# Patient Record
Sex: MALE | Race: WHITE | NOT HISPANIC OR LATINO | Employment: OTHER | URBAN - METROPOLITAN AREA
[De-identification: names, ages, dates, MRNs, and addresses within clinical notes are randomized per-mention and may not be internally consistent; named-entity substitution may affect disease eponyms.]

---

## 2017-01-02 ENCOUNTER — ANESTHESIA (OUTPATIENT)
Dept: SURGERY | Facility: CLINIC | Age: 62
End: 2017-01-02
Payer: COMMERCIAL

## 2017-01-02 ENCOUNTER — ANESTHESIA EVENT (OUTPATIENT)
Dept: SURGERY | Facility: CLINIC | Age: 62
End: 2017-01-02
Payer: COMMERCIAL

## 2017-01-02 PROCEDURE — 25800025 ZZH RX 258: Performed by: NURSE ANESTHETIST, CERTIFIED REGISTERED

## 2017-01-02 PROCEDURE — 25000125 ZZHC RX 250: Performed by: NURSE ANESTHETIST, CERTIFIED REGISTERED

## 2017-01-02 PROCEDURE — S0077 INJECTION, CLINDAMYCIN PHOSP: HCPCS | Performed by: SURGERY

## 2017-01-02 PROCEDURE — 25000125 ZZHC RX 250: Performed by: SURGERY

## 2017-01-02 RX ORDER — ONDANSETRON 2 MG/ML
INJECTION INTRAMUSCULAR; INTRAVENOUS PRN
Status: DISCONTINUED | OUTPATIENT
Start: 2017-01-02 | End: 2017-01-02

## 2017-01-02 RX ORDER — FENTANYL CITRATE 50 UG/ML
INJECTION, SOLUTION INTRAMUSCULAR; INTRAVENOUS PRN
Status: DISCONTINUED | OUTPATIENT
Start: 2017-01-02 | End: 2017-01-02

## 2017-01-02 RX ORDER — LIDOCAINE HYDROCHLORIDE 20 MG/ML
INJECTION, SOLUTION INFILTRATION; PERINEURAL PRN
Status: DISCONTINUED | OUTPATIENT
Start: 2017-01-02 | End: 2017-01-02

## 2017-01-02 RX ORDER — PROPOFOL 10 MG/ML
INJECTION, EMULSION INTRAVENOUS PRN
Status: DISCONTINUED | OUTPATIENT
Start: 2017-01-02 | End: 2017-01-02

## 2017-01-02 RX ORDER — DEXAMETHASONE SODIUM PHOSPHATE 10 MG/ML
INJECTION, SOLUTION INTRAMUSCULAR; INTRAVENOUS PRN
Status: DISCONTINUED | OUTPATIENT
Start: 2017-01-02 | End: 2017-01-02

## 2017-01-02 RX ORDER — KETOROLAC TROMETHAMINE 30 MG/ML
INJECTION, SOLUTION INTRAMUSCULAR; INTRAVENOUS PRN
Status: DISCONTINUED | OUTPATIENT
Start: 2017-01-02 | End: 2017-01-02

## 2017-01-02 RX ORDER — ACETAMINOPHEN 10 MG/ML
INJECTION, SOLUTION INTRAVENOUS PRN
Status: DISCONTINUED | OUTPATIENT
Start: 2017-01-02 | End: 2017-01-02

## 2017-01-02 RX ADMIN — FENTANYL CITRATE 100 MCG: 50 INJECTION, SOLUTION INTRAMUSCULAR; INTRAVENOUS at 13:05

## 2017-01-02 RX ADMIN — CLINDAMYCIN PHOSPHATE 900 MG: 18 INJECTION, SOLUTION INTRAVENOUS at 13:11

## 2017-01-02 RX ADMIN — ACETAMINOPHEN 1000 MG: 10 INJECTION, SOLUTION INTRAVENOUS at 13:15

## 2017-01-02 RX ADMIN — DEXAMETHASONE SODIUM PHOSPHATE 10 MG: 10 INJECTION, SOLUTION INTRAMUSCULAR; INTRAVENOUS at 13:16

## 2017-01-02 RX ADMIN — KETOROLAC TROMETHAMINE 30 MG: 30 INJECTION, SOLUTION INTRAMUSCULAR at 14:02

## 2017-01-02 RX ADMIN — PROPOFOL 200 MG: 10 INJECTION, EMULSION INTRAVENOUS at 13:09

## 2017-01-02 RX ADMIN — LIDOCAINE HYDROCHLORIDE 40 MG: 20 INJECTION, SOLUTION INFILTRATION; PERINEURAL at 13:09

## 2017-01-02 RX ADMIN — SODIUM CHLORIDE, POTASSIUM CHLORIDE, SODIUM LACTATE AND CALCIUM CHLORIDE: 600; 310; 30; 20 INJECTION, SOLUTION INTRAVENOUS at 13:00

## 2017-01-02 RX ADMIN — ONDANSETRON 4 MG: 2 INJECTION INTRAMUSCULAR; INTRAVENOUS at 14:02

## 2017-01-02 RX ADMIN — MIDAZOLAM HYDROCHLORIDE 2 MG: 1 INJECTION, SOLUTION INTRAMUSCULAR; INTRAVENOUS at 13:00

## 2017-01-02 ASSESSMENT — LIFESTYLE VARIABLES: TOBACCO_USE: 1

## 2017-01-02 NOTE — ANESTHESIA PREPROCEDURE EVALUATION
Anesthesia Evaluation     . Pt has had prior anesthetic. Type: MAC    No history of anesthetic complications     ROS/MED HX    ENT/Pulmonary:     (+)tobacco use, Current use 2 ppd x 47 years packs/day  , . .    Neurologic:  - neg neurologic ROS     Cardiovascular:  - neg cardiovascular ROS       METS/Exercise Tolerance:     Hematologic:  - neg hematologic  ROS       Musculoskeletal:  - neg musculoskeletal ROS       GI/Hepatic:     (+) Other GI/Hepatic Hx ETOH Abuse - went through treatment in 1984      Renal/Genitourinary:  - ROS Renal section negative       Endo:  - neg endo ROS       Psychiatric:  - neg psychiatric ROS       Infectious Disease:  - neg infectious disease ROS       Malignancy:      - no malignancy   Other:    - neg other ROS           Physical Exam  Normal systems: cardiovascular, pulmonary and dental    Airway   Mallampati: II  TM distance: >3 FB  Neck ROM: full    Dental   (+) upper dentures and lower dentures    Cardiovascular   Rhythm and rate: regular and normal  (-) no murmur    Pulmonary    breath sounds clear to auscultation                    Anesthesia Plan      History & Physical Review  History and physical reviewed and following examination; no interval change.    ASA Status:  2 .    NPO Status:  > 6 hours    Plan for General and LMA with Intravenous and Propofol induction. Maintenance will be Balanced.    PONV prophylaxis:  Ondansetron (or other 5HT-3) and Dexamethasone or Solumedrol       Postoperative Care  Postoperative pain management:  IV analgesics and Oral pain medications.      Consents  Anesthetic plan, risks, benefits and alternatives discussed with:  Patient.  Use of blood products discussed: No .   .                          .

## 2017-01-02 NOTE — ANESTHESIA CARE TRANSFER NOTE
Patient: Murphy Pratt    HERNIORRHAPHY INGUINAL (Left Update)  Additional InformationProcedure(s):  open mesh repair of left inguinal hernia - Wound Class: I-Clean    Diagnosis: left inguinal hernia  Diagnosis Additional Information: No value filed.    Anesthesia Type:   General, LMA     Note:  Airway :Nasal Cannula  Patient transferred to:PACU        Vitals: (Last set prior to Anesthesia Care Transfer)              Electronically Signed By: TICO Ramos CRNA  January 2, 2017  2:18 PM

## 2017-01-03 NOTE — ANESTHESIA POSTPROCEDURE EVALUATION
Patient: Murphy Pratt    HERNIORRHAPHY INGUINAL (Left Update)  Additional InformationProcedure(s):  open mesh repair of left inguinal hernia - Wound Class: I-Clean    Diagnosis:left inguinal hernia  Diagnosis Additional Information: No value filed.    Anesthesia Type:  General, LMA    Note:  Anesthesia Post Evaluation    Patient location during evaluation: Phase 2  Patient participation: Able to fully participate in evaluation  Level of consciousness: awake and alert  Pain management: adequate  Airway patency: patent  Cardiovascular status: stable  Respiratory status: spontaneous ventilation and room air  Hydration status: stable     Anesthetic complications: None    Comments: Appear to tolerate Gen well without anesthesia related problems / complications noted.  Pain level adequate per patient. No N  /  V.  No complaints per patient.  Will follow as needed.        Last vitals:  Filed Vitals:    01/02/17 1449 01/02/17 1500 01/02/17 1515   BP: 120/81 126/90 121/79   Temp:      Resp: 18 18 18   SpO2:  96%        Electronically Signed By: TICO Ramos CRNA  January 2, 2017  7:39 PM

## 2017-01-16 ENCOUNTER — OFFICE VISIT (OUTPATIENT)
Dept: SURGERY | Facility: CLINIC | Age: 62
End: 2017-01-16
Payer: COMMERCIAL

## 2017-01-16 VITALS — HEIGHT: 70 IN | TEMPERATURE: 97.6 F | WEIGHT: 170 LBS | BODY MASS INDEX: 24.34 KG/M2

## 2017-01-16 DIAGNOSIS — K40.90 HERNIA, INGUINAL, LEFT: Primary | ICD-10-CM

## 2017-01-16 PROCEDURE — 99024 POSTOP FOLLOW-UP VISIT: CPT | Performed by: SURGERY

## 2017-01-16 NOTE — MR AVS SNAPSHOT
"              After Visit Summary   1/16/2017    Murphy Pratt    MRN: [id number]           Patient Information     Date Of Birth          1955        Visit Information        Provider Department      1/16/2017 8:00 AM Rand Ricketts MD [facility]        Today's Diagnoses     Hernia, inguinal, left    -  1        Follow-ups after your visit        Who to contact     If you have questions or need follow up information about today's clinic visit or your schedule please contact [facility] directly at [phone].  Normal or non-critical lab and imaging results will be communicated to you by [employer], letter or phone within 4 business days after the clinic has received the results. If you do not hear from us within 7 days, please contact the clinic through [employer] or phone. If you have a critical or abnormal lab result, we will notify you by phone as soon as possible.  Submit refill requests through [employer] or call your pharmacy and they will forward the refill request to us. Please allow 3 business days for your refill to be completed.          Additional Information About Your Visit        MyChart Information     [employer] gives you secure access to your electronic health record. If you see a primary care provider, you can also send messages to your care team and make appointments. If you have questions, please call your primary care clinic.  If you do not have a primary care provider, please call [phone] and they will assist you.        Care EveryWhere ID     This is your Care EveryWhere ID. This could be used by other organizations to access your [place] medical records  [id number]        Your Vitals Were     Temperature Height BMI (Body Mass Index)             97.6  F (36.4  C) (Skin) 5' 10\" (1.778 m) 24.39 kg/m2          Blood Pressure from Last 3 Encounters:   01/02/17 121/79   12/16/16 114/72   10/28/16 126/74    Weight from Last 3 Encounters:   01/16/17 170 lb " (77.111 kg)   12/16/16 175 lb 3.2 oz (79.47 kg)   10/28/16 169 lb (76.658 kg)              Today, you had the following     No orders found for display       Primary Care Provider Office Phone # Fax #    Kimmie Camejo Mai, [phone]       [address] [place] [address]        Thank you!     Thank you for choosing [facility]  for your care. Our goal is always to provide you with excellent care. Hearing back from our patients is one way we can continue to improve our services. Please take a few minutes to complete the written survey that you may receive in the mail after your visit with us. Thank you!             Your Updated Medication List - Protect others around you: Learn how to safely use, store and throw away your medicines at www.disposemymeds.org.          This list is accurate as of: 1/16/17  8:23 AM.  Always use your most recent med list.                   Brand Name Dispense Instructions for use    docusate sodium 100 MG tablet    COLACE    60 tablet    Take 100 mg by mouth daily       finasteride 5 MG tablet    PROSCAR     Take 5 mg by mouth daily       oxyCODONE-acetaminophen 5-325 MG per tablet    PERCOCET    30 tablet    Take 1-2 tablets by mouth every 4 hours as needed for pain (moderate to severe)       tamsulosin 0.4 MG capsule    FLOMAX    30 capsule    Take 1 capsule (0.4 mg) by mouth daily

## 2017-01-16 NOTE — PROGRESS NOTES
1/16/2017    Subjective:Murphy Pratt returns today for follow-up of a left inguinal hernia repair with modified Kugel mesh patch. The patient denies any complaints of numbness or excessive pain. Bowel and bladder are working normally. He never took any post op pain medication.    Objective: On exam the incision is healing well with no signs of infection. There is no signs of recurrence. There is a normal healing ridge.    Assessment: Normal healing post left inguinal hernia repair with mesh.    Plan: Continue lifting restriction for full 4 weeks. Return to clinic prn.

## 2017-01-16 NOTE — NURSING NOTE
"Chief Complaint   Patient presents with     Surgical Followup     hernia repair done on 01/02/17       Initial Temp(Src) 97.6  F (36.4  C) (Skin)  Ht 5' 10\" (1.778 m)  Wt 170 lb (77.111 kg)  BMI 24.39 kg/m2 Estimated body mass index is 24.39 kg/(m^2) as calculated from the following:    Height as of this encounter: 5' 10\" (1.778 m).    Weight as of this encounter: 170 lb (77.111 kg).  BP completed using cuff size: NA (Not Taken)  Edward MCDONOUGH CMA      "

## 2017-11-02 ENCOUNTER — OFFICE VISIT (OUTPATIENT)
Dept: UROLOGY | Facility: OTHER | Age: 62
End: 2017-11-02
Payer: COMMERCIAL

## 2017-11-02 VITALS — RESPIRATION RATE: 20 BRPM | BODY MASS INDEX: 25.48 KG/M2 | HEIGHT: 70 IN | WEIGHT: 178 LBS

## 2017-11-02 DIAGNOSIS — N40.1 BENIGN PROSTATIC HYPERPLASIA WITH LOWER URINARY TRACT SYMPTOMS, SYMPTOM DETAILS UNSPECIFIED: Primary | ICD-10-CM

## 2017-11-02 DIAGNOSIS — R35.1 NOCTURIA: ICD-10-CM

## 2017-11-02 LAB
ALBUMIN UR-MCNC: NEGATIVE MG/DL
APPEARANCE UR: CLEAR
BACTERIA #/AREA URNS HPF: ABNORMAL /HPF
BILIRUB UR QL STRIP: NEGATIVE
COLOR UR AUTO: YELLOW
GLUCOSE UR STRIP-MCNC: NEGATIVE MG/DL
HGB UR QL STRIP: NEGATIVE
KETONES UR STRIP-MCNC: NEGATIVE MG/DL
LEUKOCYTE ESTERASE UR QL STRIP: ABNORMAL
NITRATE UR QL: POSITIVE
PH UR STRIP: 6.5 PH (ref 5–7)
RBC #/AREA URNS AUTO: ABNORMAL /HPF
SOURCE: ABNORMAL
SP GR UR STRIP: 1.01 (ref 1–1.03)
UROBILINOGEN UR STRIP-ACNC: 0.2 EU/DL (ref 0.2–1)
WBC #/AREA URNS AUTO: ABNORMAL /HPF

## 2017-11-02 PROCEDURE — 99203 OFFICE O/P NEW LOW 30 MIN: CPT | Mod: 25 | Performed by: UROLOGY

## 2017-11-02 PROCEDURE — 51798 US URINE CAPACITY MEASURE: CPT | Performed by: UROLOGY

## 2017-11-02 PROCEDURE — 87088 URINE BACTERIA CULTURE: CPT | Performed by: UROLOGY

## 2017-11-02 PROCEDURE — 87186 SC STD MICRODIL/AGAR DIL: CPT | Performed by: UROLOGY

## 2017-11-02 PROCEDURE — 81001 URINALYSIS AUTO W/SCOPE: CPT | Performed by: UROLOGY

## 2017-11-02 PROCEDURE — 87086 URINE CULTURE/COLONY COUNT: CPT | Performed by: UROLOGY

## 2017-11-02 RX ORDER — TAMSULOSIN HYDROCHLORIDE 0.4 MG/1
0.4 CAPSULE ORAL DAILY
Qty: 90 CAPSULE | Refills: 3 | Status: SHIPPED | OUTPATIENT
Start: 2017-11-02 | End: 2018-10-19

## 2017-11-02 RX ORDER — FINASTERIDE 5 MG/1
5 TABLET, FILM COATED ORAL DAILY
Qty: 90 TABLET | Refills: 3 | Status: SHIPPED | OUTPATIENT
Start: 2017-11-02 | End: 2018-10-19

## 2017-11-02 NOTE — NURSING NOTE
"Chief Complaint   Patient presents with     Consult     prostate issues, Pt states he use to see Dr. Webster       Initial Resp 20  Ht 5' 10\" (1.778 m)  Wt 178 lb (80.7 kg)  BMI 25.54 kg/m2 Estimated body mass index is 25.54 kg/(m^2) as calculated from the following:    Height as of this encounter: 5' 10\" (1.778 m).    Weight as of this encounter: 178 lb (80.7 kg).  Medication Reconciliation: complete       Lidya Godinez, VINITA       "

## 2017-11-02 NOTE — PROGRESS NOTES
S: Murphy Pratt is a pleasant  62 year old male who was seen for a consult with regard to patient's urinary complaints.  Patient complains of urgency/nocturia/slow urinary stream.  He has no history of elevated PSA.  Symptoms have been on going for many years(s).  Seems to be better after starting on flomax/proscar.  His recent PSA was found to be   PSA   Date Value Ref Range Status   10/28/2016 1.03 0 - 4 ug/L Final     Comment:     Assay Method:  Chemiluminescence using Siemens Vista analyzer   .  His AUA Symptom Score:  13.  His QOL score:  3.  He has some fatigue from medication.    Current Outpatient Prescriptions   Medication Sig Dispense Refill     finasteride (PROSCAR) 5 MG tablet Take 5 mg by mouth daily  2     tamsulosin (FLOMAX) 0.4 MG 24 hr capsule Take 1 capsule (0.4 mg) by mouth daily 30 capsule 0     Allergies   Allergen Reactions     Penicillins      Past Medical History:   Diagnosis Date     Personal history of alcoholism (H)     Treatment in 1984     Past Surgical History:   Procedure Laterality Date     COLONOSCOPY  04/03/2006     HC VASECTOMY UNILAT/BILAT W POSTOP SEMEN      Vasectomy     HERNIORRHAPHY INGUINAL Left 1/2/2017    Procedure: HERNIORRHAPHY INGUINAL;  Surgeon: Rand Ricketts MD;  Location: [facility] OR      Family History   Problem Relation Age of Onset     Family History Negative No family hx of      Alcohol/Drug Mother      Alcohol/Drug Father      HEART DISEASE Father      Alcohol/Drug Brother      Respiratory Brother      asthma     Alcohol/Drug Sister      He does not have a family history of prostate cancer.  Social History     Social History     Marital status:      Spouse name: N/A     Number of children: N/A     Years of education: N/A     Social History Main Topics     Smoking status: Current Every Day Smoker     Packs/day: 2.00     Years: 47.00     Types: Cigarettes     Smokeless tobacco: Never Used      Comment: 2 ppd, started age 15     Alcohol use No       Comment: quit in 1993     Drug use: No     Sexual activity: Yes     Partners: Female     Birth control/ protection: Surgical, Male Surgical      Comment: 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     Other Topics Concern     None     Social History Narrative        REVIEW OF SYSTEMS  =================  C: NEGATIVE for fever, chills, change in weight  I: NEGATIVE for worrisome rashes, moles or lesions  E/M: NEGATIVE for ear, mouth and throat problems  R: NEGATIVE for significant cough or SHORTNESS OF BREATH  CV:  NEGATIVE for chest pain, palpitations or peripheral edema  GI: NEGATIVE for nausea, abdominal pain, heartburn, or change in bowel habits  NEURO: NEGATIVE numbness/weakness  : see HPI  PSYCH: NEGATIVE depression/anxiety  LYmph: no new enlarged lymph nodes  Ortho: no new trauma/movements           O: Exam:  Constitutional: healthy, alert and no distress  Head: Normocephalic. No masses, lesions, tenderness or abnormalities  ENT: ENT exam normal, no neck nodes or sinus tenderness  Cardiovascular: negative, PMI normal.   Respiratory: negative, no evidence of respiratory distress  Gastrointestinal: Abdomen soft, non-tender. BS normal. No masses, organomegaly  : penis no d/c.  Testis no masses.  No scrotal skin lesion.  Prostate 60 gm plus smooth.    Musculoskeletal: extremities normal- no gross deformities noted, gait normal and normal muscle tone  Skin: no suspicious lesions or rashes  Neurologic: Alert and oriented  Psychiatric: mentation appears normal. and affect normal/bright  Hematologic/Lymphatic/Immunologic: normal ant/post cervical, axillary, supraclavicular and inguinal nodes    Assessment/Plan:   (N40.1) Benign prostatic hyperplasia with lower urinary tract symptoms, symptom details unspecified  (primary encounter diagnosis)  Comment:  ml  Plan: cont with meds           Discussed med/office tx/surgery.

## 2017-11-02 NOTE — MR AVS SNAPSHOT
"              After Visit Summary   11/2/2017    Murphy Pratt    MRN: [id number]           Patient Information     Date Of Birth          1955        Visit Information        Provider Department      11/2/2017 9:15 AM Jake Burleson MD [facility]        Today's Diagnoses     Benign prostatic hyperplasia with lower urinary tract symptoms, symptom details unspecified    -  1    Nocturia           Follow-ups after your visit        Who to contact     If you have questions or need follow up information about today's clinic visit or your schedule please contact [facility] directly at [phone].  Normal or non-critical lab and imaging results will be communicated to you by [employer], letter or phone within 4 business days after the clinic has received the results. If you do not hear from us within 7 days, please contact the clinic through [employer] or phone. If you have a critical or abnormal lab result, we will notify you by phone as soon as possible.  Submit refill requests through [employer] or call your pharmacy and they will forward the refill request to us. Please allow 3 business days for your refill to be completed.          Additional Information About Your Visit        MyChart Information     [employer] gives you secure access to your electronic health record. If you see a primary care provider, you can also send messages to your care team and make appointments. If you have questions, please call your primary care clinic.  If you do not have a primary care provider, please call [phone] and they will assist you.        Care EveryWhere ID     This is your Care EveryWhere ID. This could be used by other organizations to access your [place] medical records  [id number]        Your Vitals Were     Respirations Height BMI (Body Mass Index)             20 1.778 m (5' 10\") 25.54 kg/m2          Blood Pressure from Last 3 Encounters:   01/02/17 121/79   12/16/16 114/72   10/28/16 " 126/74    Weight from Last 3 Encounters:   11/02/17 80.7 kg (178 lb)   01/16/17 77.1 kg (170 lb)   12/16/16 79.5 kg (175 lb 3.2 oz)              We Performed the Following     MEASURE POST-VOID RESIDUAL URINE/BLADDER CAPACITY, US NON-IMAGING     UA reflex to Microscopic and Culture     Urine Culture Aerobic Bacterial          Where to get your medicines      These medications were sent to [address]  [address], [facility] [address]     Phone:  [phone]     finasteride 5 MG tablet    tamsulosin 0.4 MG capsule          Primary Care Provider Office Phone # Fax #    Kimmie Camejo Mai, [phone] [address] [place] [address]        Equal Access to Services     VICTOR MANUEL WEISS : Armand mcmahono Sorenetta, waaxda luqadaha, qaybta kaalmada adeegyada, yris chaparro . So [facility] [phone].    ATENCIÓN: Si habla español, tiene a quintero disposición servicios gratuitos de asistencia lingüística. Llame al [phone].    We comply with applicable federal civil rights laws and [place] laws. We do not discriminate on the basis of race, color, national origin, age, disability, sex, sexual orientation, or gender identity.            Thank you!     Thank you for choosing [facility]  for your care. Our goal is always to provide you with excellent care. Hearing back from our patients is one way we can continue to improve our services. Please take a few minutes to complete the written survey that you may receive in the mail after your visit with us. Thank you!             Your Updated Medication List - Protect others around you: Learn how to safely use, store and throw away your medicines at www.disposemymeds.org.          This list is accurate as of: 11/2/17  9:40 AM.  Always use your most recent med list.                   Brand Name Dispense Instructions for use Diagnosis    finasteride 5 MG tablet    PROSCAR    90 tablet     Take 1 tablet (5 mg) by mouth daily    Benign prostatic hyperplasia with lower urinary tract symptoms, symptom details unspecified, Nocturia       tamsulosin 0.4 MG capsule    FLOMAX    90 capsule    Take 1 capsule (0.4 mg) by mouth daily    Nocturia

## 2017-11-04 LAB
BACTERIA SPEC CULT: ABNORMAL
Lab: ABNORMAL
SPECIMEN SOURCE: ABNORMAL

## 2017-11-06 DIAGNOSIS — R30.0 DYSURIA: Primary | ICD-10-CM

## 2017-11-06 RX ORDER — SULFAMETHOXAZOLE/TRIMETHOPRIM 800-160 MG
1 TABLET ORAL 2 TIMES DAILY
Qty: 14 TABLET | Refills: 0 | Status: SHIPPED | OUTPATIENT
Start: 2017-11-06 | End: 2017-11-13

## 2017-12-04 ENCOUNTER — TELEPHONE (OUTPATIENT)
Dept: FAMILY MEDICINE | Facility: CLINIC | Age: 62
End: 2017-12-04

## 2017-12-04 NOTE — TELEPHONE ENCOUNTER
Panel Management Review      Patient has the following on his problem list: None      Composite cancer screening  Chart review shows that this patient is due/due soon for the following Colonoscopy  Summary:    Patient is due/failing the following:   COLONOSCOPY    Action needed:   Patient needs referral/order: colonosocpy    Type of outreach:    Sent iSSimple message.    Questions for provider review:    None                                                                                                                                    Tashia Broderick [facility]         Chart routed to  .

## 2018-05-29 ENCOUNTER — OFFICE VISIT (OUTPATIENT)
Dept: URGENT CARE | Facility: RETAIL CLINIC | Age: 63
End: 2018-05-29
Payer: COMMERCIAL

## 2018-05-29 VITALS — HEART RATE: 59 BPM | SYSTOLIC BLOOD PRESSURE: 114 MMHG | DIASTOLIC BLOOD PRESSURE: 72 MMHG | TEMPERATURE: 96.4 F

## 2018-05-29 DIAGNOSIS — L08.9 LOCAL INFECTION OF SKIN AND SUBCUTANEOUS TISSUE: ICD-10-CM

## 2018-05-29 DIAGNOSIS — N61.0 MASTITIS: Primary | ICD-10-CM

## 2018-05-29 PROCEDURE — 99203 OFFICE O/P NEW LOW 30 MIN: CPT | Performed by: PHYSICIAN ASSISTANT

## 2018-05-29 RX ORDER — CEPHALEXIN 500 MG/1
500 CAPSULE ORAL 4 TIMES DAILY
Qty: 40 CAPSULE | Refills: 0 | Status: SHIPPED | OUTPATIENT
Start: 2018-05-29 | End: 2018-06-09

## 2018-05-29 NOTE — PATIENT INSTRUCTIONS
Please make FOLLOW UP appointment with Dr Contreras in 10-14 days for recheck (even if this is better)  Seek prompt medical attention if > redness, pain, flucutant, red streaking.  Warm packs.  Yogurt daily.  [facility]  [phone]

## 2018-05-29 NOTE — MR AVS SNAPSHOT
After Visit Summary   5/29/2018    Murphy Pratt    MRN: [id number]           Patient Information     Date Of Birth          1955        Visit Information        Provider Department      5/29/2018 6:00 PM Sil Fischer PA-C [facility]        Today's Diagnoses     Local infection of skin and subcutaneous tissue    -  1    Mastitis          Care Instructions    Please make FOLLOW UP appointment with Dr Contreras in 10-14 days for recheck (even if this is better)  Seek prompt medical attention if > redness, pain, flucutant, red streaking.  Warm packs.  Yogurt daily.  [facility]  [phone]            Follow-ups after your visit        Who to contact     You can reach your care team any time of the day by calling [phone].  Notification of test results:  If you have an abnormal lab result, we will notify you by phone as soon as possible.         Additional Information About Your Visit        MyChart Information     [employer] gives you secure access to your electronic health record. If you see a primary care provider, you can also send messages to your care team and make appointments. If you have questions, please call your primary care clinic.  If you do not have a primary care provider, please call [phone] and they will assist you.        Care EveryWhere ID     This is your Care EveryWhere ID. This could be used by other organizations to access your [place] medical records  [id number]        Your Vitals Were     Pulse Temperature                59 96.4  F (35.8  C) (Oral)           Blood Pressure from Last 3 Encounters:   05/29/18 114/72   01/02/17 121/79   12/16/16 114/72    Weight from Last 3 Encounters:   11/02/17 178 lb (80.7 kg)   01/16/17 170 lb (77.1 kg)   12/16/16 175 lb 3.2 oz (79.5 kg)              Today, you had the following     No orders found for display         Today's Medication Changes          These changes are accurate as of 5/29/18   6:27 PM.  If you have any questions, ask your nurse or doctor.               Start taking these medicines.        Dose/Directions    cephALEXin 500 MG capsule   Commonly known as:  KEFLEX   Used for:  Mastitis, Local infection of skin and subcutaneous tissue        Dose:  500 mg   Take 1 capsule (500 mg) by mouth 4 times daily   Quantity:  40 capsule   Refills:  0            Where to get your medicines      These medications were sent to [address]  [address], [facility] [address]     Phone:  [phone]     cephALEXin 500 MG capsule                Primary Care Provider Office Phone # Fax #    Palomodarnell Camejo Mai, [phone] [address]   [place] [place] [address]        Equal Access to Services     VICTOR MANUEL WEISS : Armand mcmahono Sorenetta, waaxda luqadaha, qaybta kaalmada adeegyada, yris chaparro . So [facility] [phone].    ATENCIÓN: Si habla español, tiene a quintero disposición servicios gratuitos de asistencia lingüística. Ll[facility] [phone].    We comply with applicable federal civil rights laws and [place] laws. We do not discriminate on the basis of race, color, national origin, age, disability, sex, sexual orientation, or gender identity.            Thank you!     Thank you for choosing [facility]  for your care. Our goal is always to provide you with excellent care. Hearing back from our patients is one way we can continue to improve our services. Please take a few minutes to complete the written survey that you may receive in the mail after your visit with us. Thank you!             Your Updated Medication List - Protect others around you: Learn how to safely use, store and throw away your medicines at www.disposemymeds.org.          This list is accurate as of 5/29/18  6:27 PM.  Always use your most recent med list.                   Brand Name Dispense Instructions for use Diagnosis    ALLEGRA PO            cephALEXin 500 MG capsule    KEFLEX    40 capsule    Take 1 capsule (500 mg) by mouth 4 times daily    Mastitis, Local infection of skin and subcutaneous tissue       finasteride 5 MG tablet    PROSCAR    90 tablet    Take 1 tablet (5 mg) by mouth daily    Benign prostatic hyperplasia with lower urinary tract symptoms, symptom details unspecified, Nocturia       MULTIVITAMIN & MINERAL PO           tamsulosin 0.4 MG capsule    FLOMAX    90 capsule    Take 1 capsule (0.4 mg) by mouth daily    Nocturia       VITAMIN D (CHOLECALCIFEROL) PO      Take by mouth daily

## 2018-05-29 NOTE — PROGRESS NOTES
[facility]  Patient here with spouse concerned of pain, redness, infection left breast. 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 No known tick bites.  5/24-26 started noticing tender around left nipple and got redder and more sensitive. No change since 5/26 except nipple itself is more tender.  No drainage.  States he had left breast mastitis years ago.      ROS:  He does not feel ill.  ENT - denies ear pain, throat pain. No nasal congestion.  CP - no cough,SOB or chest pain.   GI/ - Appetite - normal. No nausea, vomiting or diarrhea.   No bowel or bladder changes   MSK - no joint pain or swelling.   Skin-  No rashes - changes other than left breast.    Last antibiotic 11/2017     Past Medical History:   Diagnosis Date     Personal history of alcoholism (H)     Treatment in 1984     Past Surgical History:   Procedure Laterality Date     COLONOSCOPY  04/03/2006     HC VASECTOMY UNILAT/BILAT W POSTOP SEMEN      Vasectomy     HERNIORRHAPHY INGUINAL Left 1/2/2017    Procedure: HERNIORRHAPHY INGUINAL;  Surgeon: Rand Ricketts MD;  Location: [facility] OR     Patient Active Problem List   Diagnosis     Advanced directives, counseling/discussion     Hernia, inguinal, left     Benign prostatic hyperplasia with lower urinary tract symptoms, unspecified morphology     Tobacco use disorder     Current Outpatient Prescriptions   Medication     Fexofenadine HCl (ALLEGRA PO)     finasteride (PROSCAR) 5 MG tablet     Multiple Vitamins-Minerals (MULTIVITAMIN & MINERAL PO)     tamsulosin (FLOMAX) 0.4 MG capsule     VITAMIN D, CHOLECALCIFEROL, PO     No current facility-administered medications for this visit.      O: /72 (BP Location: Right arm)  Pulse 59  Temp 96.4  F (35.8  C) (Oral)  Left breast there is redness and swelling in oblong fashion beyond areola - about 3 x 5 cm. Edges  outlined in skin marker. The nipple is sl swollen, very tender and warm to touch. No open area. No bite site noted.  No fluctuance.   No axillary nodes.      A:    Local infection of skin and subcutaneous tissue  Mastitis      P: Warm packs.  Prescriptions as below. Discussed indications, dosing, side affects and adverse reactions of medications with  Patient and spouse -Ceph.  Eat yogurt or take a probiotic daily.  Watch for worsening infection - discussed - seek prompt medical attention.  Needs FOLLOW UP as this is atypical.    AVS given and discussed:  Patient Instructions   Please make FOLLOW UP appointment with Dr Contreras in 10-14 days for recheck (even if this is better)  Seek prompt medical attention if > redness, pain, flucutant, red streaking.  Warm packs.  Yogurt daily.  [facility]  [phone]        Patient declined note for work    Patient is comfortable with this plan.  Electronically signed,  ALBERTINA Fischer, PAC

## 2018-06-08 ENCOUNTER — OFFICE VISIT (OUTPATIENT)
Dept: FAMILY MEDICINE | Facility: CLINIC | Age: 63
End: 2018-06-08
Payer: COMMERCIAL

## 2018-06-08 VITALS
BODY MASS INDEX: 25.22 KG/M2 | WEIGHT: 176.2 LBS | RESPIRATION RATE: 16 BRPM | HEIGHT: 70 IN | HEART RATE: 88 BPM | SYSTOLIC BLOOD PRESSURE: 114 MMHG | DIASTOLIC BLOOD PRESSURE: 76 MMHG | TEMPERATURE: 98.1 F | OXYGEN SATURATION: 94 %

## 2018-06-08 DIAGNOSIS — N60.12 MASTITIS CHRONIC, LEFT: Primary | ICD-10-CM

## 2018-06-08 PROCEDURE — 99213 OFFICE O/P EST LOW 20 MIN: CPT | Performed by: FAMILY MEDICINE

## 2018-06-08 RX ORDER — CEPHALEXIN 500 MG/1
500 CAPSULE ORAL 4 TIMES DAILY
Qty: 40 CAPSULE | Refills: 0 | Status: SHIPPED | OUTPATIENT
Start: 2018-06-08 | End: 2019-10-14

## 2018-06-08 ASSESSMENT — PAIN SCALES - GENERAL: PAINLEVEL: NO PAIN (1)

## 2018-06-08 NOTE — PROGRESS NOTES
SUBJECTIVE:   Murphy Pratt is a 63 year old male who presents to clinic today for the following health issues:    ED/UC Followup:    Facility:  [facility]  Date of visit: 5/29/18  Reason for visit: Mastitis  Current Status: Improving but      Murphy is here today for follow-up on the mastitis.  He was seen at urgent care on 5/29/18 for painful, red and infectious looking left breast and nipple.  Did not recall of any trauma or animal/tick bite to the area.  Was working in the [employer] extensively 10 days prior and believe that he might have been pumped or poked.  Symptoms started about 4 days later. He was treated for mastitis with Keflex.  Taking the medication as prescribed no side effect.  Overall it is much better.  Breast redness is resolving.  The nipple still red and swollen.  Still is sensitive to the touch.  Never had this before.  No drainage.  No fever or chills.  No chest pain or shortness of breath.  No other concern      Problem list and histories reviewed & adjusted, as indicated.  Additional history: as documented    Current Outpatient Prescriptions   Medication Sig Dispense Refill     cephALEXin (KEFLEX) 500 MG capsule Take 1 capsule (500 mg) by mouth 4 times daily 40 capsule 0     Fexofenadine HCl (ALLEGRA PO)        finasteride (PROSCAR) 5 MG tablet Take 1 tablet (5 mg) by mouth daily 90 tablet 3     Multiple Vitamins-Minerals (MULTIVITAMIN & MINERAL PO)        tamsulosin (FLOMAX) 0.4 MG capsule Take 1 capsule (0.4 mg) by mouth daily 90 capsule 3     VITAMIN D, CHOLECALCIFEROL, PO Take by mouth daily       Allergies   Allergen Reactions     Penicillins        Reviewed and updated as needed this visit by clinical staff  Tobacco  Allergies  Meds  Soc Hx      Reviewed and updated as needed this visit by Provider         ROS:  Constitutional, HEENT, cardiovascular, pulmonary, gi and gu systems are negative, except as otherwise noted.    OBJECTIVE:     /76 (BP Location:  "Right arm, Patient Position: Sitting, Cuff Size: Adult Regular)  Pulse 88  Temp 98.1  F (36.7  C) (Temporal)  Resp 16  Ht 5' 10\" (1.778 m)  Wt 176 lb 3.2 oz (79.9 kg)  SpO2 94%  BMI 25.28 kg/m2  Body mass index is 25.28 kg/(m^2).  GENERAL: healthy, alert and no distress  RESP: lungs clear to auscultation - no rales, rhonchi or wheezes  BREAST: left nipple is red, sensitive with no drainage.  It is sensitive and tender to the touch.  No nipple discharge or palpable axillary masses or adenopathy.  No mass was palpated to the left breast  CV: regular rate and rhythm, no murmur.    Diagnostic Test Results:  none     ASSESSMENT/PLAN:       ICD-10-CM    1. Mastitis chronic, left N60.12 cephALEXin (KEFLEX) 500 MG capsule     Improving with the Keflex.  Will treat him with another course of Keflex.  Call in or follow-up if not improved or resolved in the next for 5 days.  Will consider ultrasound at that time.  Tylenol Motrin as needed for pain.      Kimmie Camejo Mai, MD  [facility]    "

## 2018-06-08 NOTE — NURSING NOTE
Chief Complaint   Patient presents with     Hospital F/U     Health Maintenance Due   Topic Date Due     HIV SCREEN (SYSTEM ASSIGNED)  01/23/1973     HEPATITIS C SCREENING  01/23/1973     COLON CANCER SCREEN (SYSTEM ASSIGNED)  04/03/2016     Health Maintenance reviewed at today's visit patient asked to schedule/complete:   Colon Cancer:  Patient declined     Hoang Mejia, VINITA

## 2018-10-18 ENCOUNTER — OFFICE VISIT (OUTPATIENT)
Dept: UROLOGY | Facility: OTHER | Age: 63
End: 2018-10-18
Payer: COMMERCIAL

## 2018-10-18 VITALS
SYSTOLIC BLOOD PRESSURE: 148 MMHG | WEIGHT: 170 LBS | OXYGEN SATURATION: 96 % | DIASTOLIC BLOOD PRESSURE: 77 MMHG | HEART RATE: 62 BPM | BODY MASS INDEX: 24.34 KG/M2 | HEIGHT: 70 IN

## 2018-10-18 DIAGNOSIS — R03.0 ELEVATED BP WITHOUT DIAGNOSIS OF HYPERTENSION: ICD-10-CM

## 2018-10-18 DIAGNOSIS — N40.1 BENIGN PROSTATIC HYPERPLASIA WITH LOWER URINARY TRACT SYMPTOMS, SYMPTOM DETAILS UNSPECIFIED: Primary | ICD-10-CM

## 2018-10-18 PROCEDURE — 51798 US URINE CAPACITY MEASURE: CPT | Performed by: UROLOGY

## 2018-10-18 PROCEDURE — 99213 OFFICE O/P EST LOW 20 MIN: CPT | Mod: 25 | Performed by: UROLOGY

## 2018-10-18 NOTE — PROGRESS NOTES
Bladder scan performed. Maximum post void residual after 3 scans was 171 ml. MD was informed.    Deborah Bhatt RN

## 2018-10-18 NOTE — PROGRESS NOTES
Chief Complaint   Patient presents with     RECHECK     BPH       Murphy Pratt is a 63 year old male who presents today for follow up of   Chief Complaint   Patient presents with     RECHECK     BPH    f/u for LUTS.  He is on proscar/flomax.  He still has LUTs symptoms.  He has no fatigue from flomax.  He has some concentration issue from it also.      Current Outpatient Prescriptions   Medication Sig Dispense Refill     cephALEXin (KEFLEX) 500 MG capsule Take 1 capsule (500 mg) by mouth 4 times daily 40 capsule 0     Fexofenadine HCl (ALLEGRA PO)        finasteride (PROSCAR) 5 MG tablet Take 1 tablet (5 mg) by mouth daily 90 tablet 3     Multiple Vitamins-Minerals (MULTIVITAMIN & MINERAL PO)        tamsulosin (FLOMAX) 0.4 MG capsule Take 1 capsule (0.4 mg) by mouth daily 90 capsule 3     VITAMIN D, CHOLECALCIFEROL, PO Take by mouth daily       Allergies   Allergen Reactions     Penicillins       Past Medical History:   Diagnosis Date     Personal history of alcoholism (H)     Treatment in 1984     Past Surgical History:   Procedure Laterality Date     COLONOSCOPY  04/03/2006     HC VASECTOMY UNILAT/BILAT W POSTOP SEMEN      Vasectomy     HERNIORRHAPHY INGUINAL Left 1/2/2017    Procedure: HERNIORRHAPHY INGUINAL;  Surgeon: Rand Ricketts MD;  Location: [facility]     Family History   Problem Relation Age of Onset     Family History Negative No family hx of      Alcohol/Drug Mother      Alcohol/Drug Father      HEART DISEASE Father      Alcohol/Drug Brother      Respiratory Brother      asthma     Alcohol/Drug Sister      Social History     Social History     Marital status:      Spouse name: N/A     Number of children: N/A     Years of education: N/A     Social History Main Topics     Smoking status: Current Every Day Smoker     Packs/day: 2.00     Years: 47.00     Types: Cigarettes     Smokeless tobacco: Never Used      Comment: 2 ppd, started age 15     Alcohol use No      Comment: quit in 1993     Drug  "use: No     Sexual activity: Yes     Partners: Female     Birth control/ protection: Surgical, Male Surgical      Comment: 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     Other Topics Concern     None     Social History Narrative       REVIEW OF SYSTEMS  =================  C: NEGATIVE for fever, chills, change in weight  I: NEGATIVE for worrisome rashes, moles or lesions  E/M: NEGATIVE for ear, mouth and throat problems  R: NEGATIVE for significant cough or SHORTNESS OF BREATH,   CV: NEGATIVE for chest pain, palpitations or peripheral edema  GI: NEGATIVE for nausea, abdominal pain, heartburn, or change in bowel habits  NEURO: NEGATIVE any motor/sensory changes  PSYCH: NEGATIVE for recent mood disorder    Physical Exam:  /77 (BP Location: Right arm, Patient Position: Chair, Cuff Size: Adult Regular)  Pulse 62  Ht 1.778 m (5' 10\")  Wt 77.1 kg (170 lb)  SpO2 96%  BMI 24.39 kg/m2   Patient is pleasant, in no acute distress, good general condition.  Lung: no evidence of respiratory distress    Abdomen: Soft, nondistended, non tender. No masses. No rebound or guarding.   Exam: bladder scan 170 ml .  No cva tenderness  Skin: Warm and dry.  No redness.  Psych: normal mood and affect  Neuro: alert and oriented  Musculaskeletal: moving all extremities    Assessment/Plan:   (N40.1) Benign prostatic hyperplasia with lower urinary tract symptoms, symptom details unspecified  (primary encounter diagnosis)  Comment:    Plan: discussed tx options           He is thinking about surgery as an option           TURP discussed           Risks and benefits discussed at length            Schedule for cysto in one year               Info of xps laser given        (R03.0) Elevated BP without diagnosis of hypertension  Comment:    Plan:  Patient to follow up with Primary Care provider regarding elevated blood pressure.                    "

## 2018-10-18 NOTE — MR AVS SNAPSHOT
After Visit Summary   10/18/2018    Murphy Pratt    MRN: [id number]           Patient Information     Date Of Birth          1955        Visit Information        Provider Department      10/18/2018 9:45 AM Jake Burleson MD [facility]        Today's Diagnoses     Benign prostatic hyperplasia with lower urinary tract symptoms, symptom details unspecified    -  1    Elevated BP without diagnosis of hypertension           Follow-ups after your visit        Your next 10 appointments already scheduled     Sep 26, 2019  9:00 AM CDT   Return Visit with Jake [facility]leson MD   [facility]    [address] [facility] [id number] [phone]              Who to contact     If you have questions or need follow up information about today's clinic visit or your schedule please contact [facility] directly at [phone].  Normal or non-critical lab and imaging results will be communicated to you by MyChart, letter or phone within 4 business days after the clinic has received the results. If you do not hear from us within 7 days, please contact the clinic through [employer] or phone. If you have a critical or abnormal lab result, we will notify you by phone as soon as possible.  Submit refill requests through [employer] or call your pharmacy and they will forward the refill request to us. Please allow 3 business days for your refill to be completed.          Additional Information About Your Visit        MyChart Information     [employer] gives you secure access to your electronic health record. If you see a primary care provider, you can also send messages to your care team and make appointments. If you have questions, please call your primary care clinic.  If you do not have a primary care provider, please call [phone] and they will assist you.        Care EveryWhere ID     This is your Care EveryWhere ID. This could be used by  "other organizations to access your [facility] medical records  [id number]        Your Vitals Were     Pulse Height Pulse Oximetry BMI (Body Mass Index)          62 1.778 m (5' 10\") 96% 24.39 kg/m2         Blood Pressure from Last 3 Encounters:   10/18/18 148/77   06/08/18 114/76   05/29/18 114/72    Weight from Last 3 Encounters:   10/18/18 77.1 kg (170 lb)   06/08/18 79.9 kg (176 lb 3.2 oz)   11/02/17 80.7 kg (178 lb)              We Performed the Following     MEASURE POST-VOID RESIDUAL URINE/BLADDER CAPACITY, US NON-IMAGING        Primary Care Provider Office Phone # Fax #    Kimmie Camejo Mai, [phone] [address] [place] [address]        Equal Access to Services     [facility]: Hadii aad ku hadasho Soomaali, waaxda luqadaha, qaybta kaalmada adeegyada, yris spears haylidnsey chaparro . So [facility] [phone].    ATENCIÓN: Si habla español, tiene a quintero disposición servicios gratuitos de asistencia lingüística. [facility] [phone].    We comply with applicable federal civil rights laws and [place] laws. We do not discriminate on the basis of race, color, national origin, age, disability, sex, sexual orientation, or gender identity.            Thank you!     Thank you for choosing [facility]  for your care. Our goal is always to provide you with excellent care. Hearing back from our patients is one way we can continue to improve our services. Please take a few minutes to complete the written survey that you may receive in the mail after your visit with us. Thank you!             Your Updated Medication List - Protect others around you: Learn how to safely use, store and throw away your medicines at www.disposemymeds.org.          This list is accurate as of 10/18/18 10:22 AM.  Always use your most recent med list.                   Brand Name Dispense Instructions for use Diagnosis    ALLEGRA PO           cephALEXin 500 MG capsule    KEFLEX    40 capsule    " Take 1 capsule (500 mg) by mouth 4 times daily    Mastitis chronic, left       finasteride 5 MG tablet    PROSCAR    90 tablet    Take 1 tablet (5 mg) by mouth daily    Benign prostatic hyperplasia with lower urinary tract symptoms, symptom details unspecified, Nocturia       MULTIVITAMIN & MINERAL PO           tamsulosin 0.4 MG capsule    FLOMAX    90 capsule    Take 1 capsule (0.4 mg) by mouth daily    Nocturia       VITAMIN D (CHOLECALCIFEROL) PO      Take by mouth daily

## 2018-10-19 ENCOUNTER — MYC REFILL (OUTPATIENT)
Dept: UROLOGY | Facility: CLINIC | Age: 63
End: 2018-10-19

## 2018-10-19 DIAGNOSIS — R35.1 NOCTURIA: ICD-10-CM

## 2018-10-19 DIAGNOSIS — N40.1 BENIGN PROSTATIC HYPERPLASIA WITH LOWER URINARY TRACT SYMPTOMS, SYMPTOM DETAILS UNSPECIFIED: ICD-10-CM

## 2018-10-23 RX ORDER — TAMSULOSIN HYDROCHLORIDE 0.4 MG/1
0.4 CAPSULE ORAL DAILY
Qty: 90 CAPSULE | Refills: 3 | Status: ON HOLD | OUTPATIENT
Start: 2018-10-23 | End: 2019-10-24

## 2018-10-23 RX ORDER — FINASTERIDE 5 MG/1
5 TABLET, FILM COATED ORAL DAILY
Qty: 90 TABLET | Refills: 3 | Status: ON HOLD | OUTPATIENT
Start: 2018-10-23 | End: 2019-10-24

## 2018-10-23 NOTE — TELEPHONE ENCOUNTER
Message from MyChart:  Original authorizing provider: MD Murphy Weber would like a refill of the following medications:  tamsulosin (FLOMAX) 0.4 MG capsule [Jake Burleson MD]  finasteride (PROSCAR) 5 MG tablet [Jake Burleson MD]    Preferred pharmacy: [address] - [address]    Comment:  Please Send refill information to the pharmacy at [facility]'s

## 2018-12-09 ENCOUNTER — OFFICE VISIT (OUTPATIENT)
Dept: URGENT CARE | Facility: RETAIL CLINIC | Age: 63
End: 2018-12-09
Payer: COMMERCIAL

## 2018-12-09 VITALS
OXYGEN SATURATION: 98 % | TEMPERATURE: 97.7 F | SYSTOLIC BLOOD PRESSURE: 146 MMHG | HEART RATE: 73 BPM | DIASTOLIC BLOOD PRESSURE: 82 MMHG

## 2018-12-09 DIAGNOSIS — J20.9 ACUTE BRONCHITIS WITH SYMPTOMS > 10 DAYS: Primary | ICD-10-CM

## 2018-12-09 PROCEDURE — 99213 OFFICE O/P EST LOW 20 MIN: CPT | Performed by: FAMILY MEDICINE

## 2018-12-09 RX ORDER — AZITHROMYCIN 250 MG/1
TABLET, FILM COATED ORAL
Qty: 6 TABLET | Refills: 0 | Status: SHIPPED | OUTPATIENT
Start: 2018-12-09 | End: 2019-10-14

## 2019-09-26 ENCOUNTER — PREP FOR PROCEDURE (OUTPATIENT)
Dept: UROLOGY | Facility: OTHER | Age: 64
End: 2019-09-26

## 2019-09-26 ENCOUNTER — APPOINTMENT (OUTPATIENT)
Dept: FAMILY MEDICINE | Facility: OTHER | Age: 64
End: 2019-09-26
Payer: COMMERCIAL

## 2019-09-26 ENCOUNTER — OFFICE VISIT (OUTPATIENT)
Dept: UROLOGY | Facility: OTHER | Age: 64
End: 2019-09-26
Payer: COMMERCIAL

## 2019-09-26 DIAGNOSIS — N40.1 BENIGN PROSTATIC HYPERPLASIA WITH LOWER URINARY TRACT SYMPTOMS, SYMPTOM DETAILS UNSPECIFIED: Primary | ICD-10-CM

## 2019-09-26 PROCEDURE — 52000 CYSTOURETHROSCOPY: CPT | Performed by: UROLOGY

## 2019-09-26 PROCEDURE — 99214 OFFICE O/P EST MOD 30 MIN: CPT | Mod: 25 | Performed by: UROLOGY

## 2019-09-26 RX ORDER — CEFAZOLIN SODIUM 2 G/100ML
2 INJECTION, SOLUTION INTRAVENOUS
Status: CANCELLED | OUTPATIENT
Start: 2019-09-26

## 2019-09-26 RX ORDER — CEFAZOLIN SODIUM 1 G/50ML
1 SOLUTION INTRAVENOUS SEE ADMIN INSTRUCTIONS
Status: CANCELLED | OUTPATIENT
Start: 2019-09-26

## 2019-09-26 NOTE — PROGRESS NOTES
Chief Complaint   Patient presents with     Cystoscopy       Murphy Pratt is a 64 year old male who presents today for follow up of   Chief Complaint   Patient presents with     Cystoscopy   Patient is a pleasant 64-year-old gentleman with long history of BPH.  He is on Flomax right now along with finasteride despite medications he continues to have moderate to significant urinary symptoms.    Current Outpatient Medications   Medication Sig Dispense Refill     azithromycin (ZITHROMAX) 250 MG tablet Two tablets first day, then one tablet daily for four days. 6 tablet 0     cephALEXin (KEFLEX) 500 MG capsule Take 1 capsule (500 mg) by mouth 4 times daily 40 capsule 0     Fexofenadine HCl (ALLEGRA PO)        finasteride (PROSCAR) 5 MG tablet Take 1 tablet (5 mg) by mouth daily 90 tablet 3     Multiple Vitamins-Minerals (MULTIVITAMIN & MINERAL PO)        tamsulosin (FLOMAX) 0.4 MG capsule Take 1 capsule (0.4 mg) by mouth daily 90 capsule 3     VITAMIN D, CHOLECALCIFEROL, PO Take by mouth daily       Allergies   Allergen Reactions     Penicillins       Past Medical History:   Diagnosis Date     Personal history of alcoholism (H)     Treatment in 1984     Past Surgical History:   Procedure Laterality Date     COLONOSCOPY  04/03/2006     HC VASECTOMY UNILAT/BILAT W POSTOP SEMEN      Vasectomy     HERNIORRHAPHY INGUINAL Left 1/2/2017    Procedure: HERNIORRHAPHY INGUINAL;  Surgeon: Rand Ricketts MD;  Location:  OR     Family History   Problem Relation Age of Onset     Family History Negative No family hx of      Alcohol/Drug Mother      Alcohol/Drug Father      Heart Disease Father      Alcohol/Drug Brother      Respiratory Brother         asthma     Alcohol/Drug Sister      Social History     Socioeconomic History     Marital status:      Spouse name: Not on file     Number of children: Not on file     Years of education: Not on file     Highest education level: Not on file   Occupational History     Not  on file   Social Needs     Financial resource strain: Not on file     Food insecurity:     Worry: Not on file     Inability: Not on file     Transportation needs:     Medical: Not on file     Non-medical: Not on file   Tobacco Use     Smoking status: Current Every Day Smoker     Packs/day: 2.00     Years: 47.00     Pack years: 94.00     Types: Cigarettes     Smokeless tobacco: Never Used     Tobacco comment: 2 ppd, started age 15   Substance and Sexual Activity     Alcohol use: No     Alcohol/week: 0.0 standard drinks     Comment: quit in 1993     Drug use: No     Sexual activity: Yes     Partners: Female     Birth control/protection: Surgical, Male Surgical     Comment: 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   Lifestyle     Physical activity:     Days per week: Not on file     Minutes per session: Not on file     Stress: Not on file   Relationships     Social connections:     Talks on phone: Not on file     Gets together: Not on file     Attends [employer] service: Not on file     Active member of club or organization: Not on file     Attends meetings of clubs or organizations: Not on file     Relationship status: Not on file     Intimate partner violence:     Fear of current or ex partner: Not on file     Emotionally abused: Not on file     Physically abused: Not on file     Forced sexual activity: Not on file   Other Topics Concern     Parent/sibling w/ CABG, MI or angioplasty before 65F 55M? Not Asked   Social History Narrative     Not on file       REVIEW OF SYSTEMS  =================  C: NEGATIVE for fever, chills, change in weight  I: NEGATIVE for worrisome rashes, moles or lesions  E/M: NEGATIVE for ear, mouth and throat problems  R: NEGATIVE for significant cough or SHORTNESS OF BREATH  CV:  NEGATIVE for chest pain, palpitations or peripheral edema  GI: NEGATIVE for nausea, abdominal pain, heartburn, or change in bowel habits  NEURO: NEGATIVE numbness/weakness  : see HPI  PSYCH:  NEGATIVE depression/anxiety  LYmph: no new enlarged lymph nodes  Ortho: no new trauma/movements    Physical Exam:  There were no vitals taken for this visit.   Patient is pleasant, in no acute distress, good general condition.  Lung: no evidence of respiratory distress    Abdomen: Soft, nondistended, non tender. No masses. No rebound or guarding.   Exam: Penis no discharge.  Testes without any masses but no scrotal skin lesion.  No CVA tenderness.  Skin: Warm and dry.  No redness.  Psych: normal mood and affect  Neuro: alert and oriented  Musculaskeletal: moving all extremities    Cystoscopy done today.  Patient is draped and prepped.  Flexible cystoscopy placed under direct vision.      The anterior urethra is normal   The prostatic urethra showed trilobar enlargement.     The length is 3cm,  the coaptation is 3 cm.     In the bladder there is trabeculation grade 2.    Assessment/Plan:  (N40.1) Benign prostatic hyperplasia with lower urinary tract symptoms, symptom details unspecified  (primary encounter diagnosis)  Comment: Obstructive prostate.  Plan:   Treatment options discussed with patient today.  He is interested in having expressed laser TURP.  His brother had it recently.  Video of the procedure reviewed.  Information about surgery discussed.  Risks of bleeding, infection, ED, incontinence, retrograde ejaculation discussed.  Information about the procedure provided.  We will schedule this elective procedure near future.

## 2019-10-14 ENCOUNTER — OFFICE VISIT (OUTPATIENT)
Dept: FAMILY MEDICINE | Facility: CLINIC | Age: 64
End: 2019-10-14
Payer: COMMERCIAL

## 2019-10-14 VITALS
WEIGHT: 175.4 LBS | BODY MASS INDEX: 24.56 KG/M2 | RESPIRATION RATE: 16 BRPM | DIASTOLIC BLOOD PRESSURE: 68 MMHG | HEIGHT: 71 IN | TEMPERATURE: 97.3 F | HEART RATE: 81 BPM | SYSTOLIC BLOOD PRESSURE: 132 MMHG | OXYGEN SATURATION: 93 %

## 2019-10-14 DIAGNOSIS — N40.1 BENIGN PROSTATIC HYPERPLASIA WITH URINARY FREQUENCY: ICD-10-CM

## 2019-10-14 DIAGNOSIS — R35.0 BENIGN PROSTATIC HYPERPLASIA WITH URINARY FREQUENCY: ICD-10-CM

## 2019-10-14 DIAGNOSIS — Z01.818 PREOP GENERAL PHYSICAL EXAM: Primary | ICD-10-CM

## 2019-10-14 DIAGNOSIS — Z12.11 COLON CANCER SCREENING: ICD-10-CM

## 2019-10-14 DIAGNOSIS — F17.200 TOBACCO USE DISORDER: ICD-10-CM

## 2019-10-14 DIAGNOSIS — F10.21 ALCOHOL DEPENDENCE IN REMISSION (H): ICD-10-CM

## 2019-10-14 DIAGNOSIS — Z28.20 VACCINE REFUSED BY PATIENT: ICD-10-CM

## 2019-10-14 LAB
ALBUMIN UR-MCNC: NEGATIVE MG/DL
APPEARANCE UR: CLEAR
BILIRUB UR QL STRIP: NEGATIVE
COLOR UR AUTO: YELLOW
GLUCOSE UR STRIP-MCNC: NEGATIVE MG/DL
HGB UR QL STRIP: NEGATIVE
KETONES UR STRIP-MCNC: NEGATIVE MG/DL
LEUKOCYTE ESTERASE UR QL STRIP: NEGATIVE
MUCOUS THREADS #/AREA URNS LPF: PRESENT /LPF
NITRATE UR QL: NEGATIVE
PH UR STRIP: 6 PH (ref 5–7)
RBC #/AREA URNS AUTO: <1 /HPF (ref 0–2)
SOURCE: ABNORMAL
SP GR UR STRIP: 1.01 (ref 1–1.03)
UROBILINOGEN UR STRIP-MCNC: 0 MG/DL (ref 0–2)
WBC #/AREA URNS AUTO: 3 /HPF (ref 0–5)

## 2019-10-14 PROCEDURE — 99214 OFFICE O/P EST MOD 30 MIN: CPT | Performed by: FAMILY MEDICINE

## 2019-10-14 PROCEDURE — 81001 URINALYSIS AUTO W/SCOPE: CPT | Performed by: FAMILY MEDICINE

## 2019-10-14 ASSESSMENT — MIFFLIN-ST. JEOR: SCORE: 1609.32

## 2019-10-14 ASSESSMENT — PAIN SCALES - GENERAL: PAINLEVEL: NO PAIN (0)

## 2019-10-14 NOTE — PROGRESS NOTES
[address] [address]  [phone]  Dept: [phone]    PRE-OP EVALUATION:  Today's date: 10/14/2019    Murphy Pratt (: 1955) presents for pre-operative evaluation assessment as requested by Dr. Burleson.  He requires evaluation and anesthesia risk assessment prior to undergoing surgery/procedure for treatment of Benign Prostatic Hyperplasia .    Proposed Surgery/ Procedure: Transurethral resection prostate (TURP) using KTP laser  Date of Surgery/ Procedure: 10/24/2019  Time of Surgery/ Procedure: 12:00pm  Hospital/Surgical Facility: [facility]     Primary Physician: Kimmie Cnotreras  Type of Anesthesia Anticipated: General    Patient has a Health Care Directive or Living Will:  YES on file     1. NO - Do you have a history of heart attack, stroke, stent, bypass or surgery on an artery in the head, neck, heart or legs?  2. NO - Do you ever have any pain or discomfort in your chest?  3. NO - Do you have a history of  Heart Failure?  4. NO - Are you troubled by shortness of breath when: walking on the level, up a slight hill or at night?  5. NO - Do you currently have a cold, bronchitis or other respiratory infection?  6. NO - Do you have a cough, shortness of breath or wheezing?  7. NO - Do you sometimes get pains in the calves of your legs when you walk?  8. NO - Do you or anyone in your family have previous history of blood clots?  9. NO - Do you or does anyone in your family have a serious bleeding problem such as prolonged bleeding following surgeries or cuts?  10. NO - Have you ever had problems with anemia or been told to take iron pills?  11. NO - Have you had any abnormal blood loss such as black, tarry or bloody stools, or abnormal vaginal bleeding?  12. NO - Have you ever had a blood transfusion?  13. NO - Have you or any of your relatives ever had problems with anesthesia?  14. YES - DO YOU HAVE SLEEP APNEA, EXCESSIVE SNORING OR DAYTIME DROWSINESS? Yes  due to medication   15. NO - Do you have any prosthetic heart valves?  16. NO - Do you have prosthetic joints?  17. NO - Is there any chance that you may be pregnant?      HPI:     HPI related to upcoming procedure:     Murphy is here today for pre-op physical. He is scheduled for laser TURP on 10/24/19 with Dr. Burleson at [facility] for BPH.  Failed conservative medical management. It is expected to be the same day procedure with general anesthesia. No personal or family history of anesthesia complication or personal pre-matured CAD or MI.  Father  of MI at 51.  Has not been on steroid orally in the last 6 months.  Not taking Aspirin or other form of blood thinner.  Last dose of NSAID was weeks ago.   Stated that he was well informed about the procedure and is ready to have the procedure done.    Generally is healthy - not taking any medication chronically except of the Finasteride and Flomax.  He smokew 2 ppd and has been a smoker for about 50 years.  No history of COPD or asthma.  Used to drink alcohol heavily.  Quitted drinking since .    He generally is doing well and has no concern today. No headache or dizziness. No URI symptoms include running nose, nasal congestion, ST, coughing, fever or chill.  No chest pain or SOB.  No N/V/D/C and denied of having problem with urination. No other concern      MEDICAL HISTORY:     Patient Active Problem List    Diagnosis Date Noted     Benign prostatic hyperplasia with lower urinary tract symptoms, symptom details unspecified 2019     Priority: Medium     Added automatically from request for surgery [id number]       Hernia, inguinal, left 10/28/2016     Priority: Medium     Benign prostatic hyperplasia with lower urinary tract symptoms 10/28/2016     Priority: Medium     Tobacco use disorder 10/28/2016     Priority: Medium     Advanced directives, counseling/discussion 10/21/2016     Priority: Medium     Gave pt information on 10/21/16.          Past Medical  "History:   Diagnosis Date     Personal history of alcoholism (H)     Treatment in 1984     Past Surgical History:   Procedure Laterality Date     COLONOSCOPY  04/03/2006     HC VASECTOMY UNILAT/BILAT W POSTOP SEMEN      Vasectomy     HERNIORRHAPHY INGUINAL Left 1/2/2017    Procedure: HERNIORRHAPHY INGUINAL;  Surgeon: Rand Ricketts MD;  Location:  OR     Current Outpatient Medications   Medication Sig Dispense Refill     Fexofenadine HCl (ALLEGRA PO)        finasteride (PROSCAR) 5 MG tablet Take 1 tablet (5 mg) by mouth daily 90 tablet 3     Multiple Vitamins-Minerals (MULTIVITAMIN & MINERAL PO)        tamsulosin (FLOMAX) 0.4 MG capsule Take 1 capsule (0.4 mg) by mouth daily 90 capsule 3     VITAMIN D, CHOLECALCIFEROL, PO Take by mouth daily       OTC products: None, except as noted above    Allergies   Allergen Reactions     Penicillins       Latex Allergy: NO    Social History     Tobacco Use     Smoking status: Current Every Day Smoker     Packs/day: 2.00     Years: 47.00     Pack years: 94.00     Types: Cigarettes     Smokeless tobacco: Never Used     Tobacco comment: 2 ppd, started age 15   Substance Use Topics     Alcohol use: No     Alcohol/week: 0.0 standard drinks     Comment: quit in 1993     History   Drug Use No       REVIEW OF SYSTEMS:   Constitutional, neuro, ENT, endocrine, pulmonary, cardiac, gastrointestinal, genitourinary, musculoskeletal, integument and psychiatric systems are negative, except as otherwise noted.    EXAM:   BP (!) 142/76   Pulse 81   Temp 97.3  F (36.3  C) (Temporal)   Resp 16   Ht 1.806 m (5' 11.1\")   Wt 79.6 kg (175 lb 6.4 oz)   SpO2 93%   BMI 24.39 kg/m        GENERAL APPEARANCE: healthy, alert and no distress     EYES: EOMI,- PERRL     HENT: ear canals and TM's normal. Nares are non-congested. Oropharynx is pink and moist, no ulcers or lesions. No tender with palpation to the sinuses.     NECK: no adenopathy, no asymmetry.  No tender with palpation to the " cervical spine and its para-spinous muscle bilaterally.     RESP: lungs clear to auscultation - no rales, rhonchi or wheezes     CV: regular rates and rhythm and no murmur.     ABDOMEN:  soft, nontender, nondistended with no palpable masses and bowel sounds normal     MS: extremities normal- no gross deformities noted.  No edema.  All 4 extremities are equally in strength.     NEURO: Normal strength and tone,  mentation intact and speech normal.  No focal neurological deficit     PSYCH: mentation appears normal. and affect normal/bright           DIAGNOSTICS:     EKG: Not indicated due to non-vascular surgery and low risk of event (age <65 and without cardiac risk factors)    Labs Resulted Today:   Results for orders placed or performed in visit on 10/14/19   UA with Microscopic reflex to Culture (Emilie Bradshaw; [facility])   Result Value Ref Range    Color Urine Yellow     Appearance Urine Clear     Glucose Urine Negative NEG^Negative mg/dL    Bilirubin Urine Negative NEG^Negative    Ketones Urine Negative NEG^Negative mg/dL    Specific Gravity Urine 1.008 1.003 - 1.035    Blood Urine Negative NEG^Negative    pH Urine 6.0 5.0 - 7.0 pH    Protein Albumin Urine Negative NEG^Negative mg/dL    Urobilinogen mg/dL 0.0 0.0 - 2.0 mg/dL    Nitrite Urine Negative NEG^Negative    Leukocyte Esterase Urine Negative NEG^Negative    Source Unspecified Urine     WBC Urine 3 0 - 5 /HPF    RBC Urine <1 0 - 2 /HPF    Mucous Urine Present (A) NEG^Negative /LPF       Recent Labs   Lab Test 10/28/16  0845   HGB 15.4         POTASSIUM 4.0   CR 0.82        IMPRESSION:   Reason for surgery/procedure: BPH with lower urinary symptoms - failed medications  Diagnosis/reason for consult: pre-op physical to evaluate for anesthesia and its alycia-operative risks.    The proposed surgical procedure is considered INTERMEDIATE risk.    REVISED CARDIAC RISK INDEX  The patient has the following serious cardiovascular risks for  perioperative complications such as (MI, PE, VFib and 3  AV Block):  No serious cardiac risks  INTERPRETATION: 0 risks: Class I (very low risk - 0.4% complication rate)    The patient has the following additional risks for perioperative complications:  Heavy smoker - smokes 2 ppd for 50 years      ICD-10-CM    1. Preop general physical exam Z01.818 UA with Microscopic reflex to Culture ([facility])   2. Benign prostatic hyperplasia with urinary frequency N40.1 UA with Microscopic reflex to Culture ([facility])    R35.0    3. Tobacco use disorder F17.200    4. Alcohol dependence in remission (H) F10.21    5. Vaccine refused by patient Z28.20    6. Colon cancer screening Z12.11 GASTROENTEROLOGY ADULT REF PROCEDURE ONLY       RECOMMENDATIONS:     --No history of DVT or PE history, PE/DVT prophylaxis per surgeon's desire/recommendation.    Cardiovascular Risk  No cardiovascular risk identified.  No history of CAD, CVA, diabetes or high blood pressure.      Pulmonary Risk - due to being a heavy smoker - 2 ppd for 50 years  Incentive spirometry post op  Respiratory Therapy (Respiratory Care IP Consult)  post op as needed  NG tube decompression if abdominal distension or significant vomiting   Advised smoking cessation.   Oxygen to maintain O2 sat above 92% during and after procedure.      Obstructive Sleep Apnea (or suspected sleep apnea)  No risk for sleep apnea identified.  He is not morbidly obese.      Anemia  No history of anemia or blood transfusion.  He is okay to be transfused if necessary.      --Patient is to take all scheduled medications on the day of surgery EXCEPT for modifications listed below.  Please take your medications as prescribed except.    No Aspirin or NSAID (Ibuprofen, Aleve, Motrin, Naproxen, ect.) until after the procedure  Hold all meds on am of procedure   Nothing to eat or drink for 8 hrs before the procedure.      APPROVAL GIVEN to proceed with proposed  procedure, without further diagnostic evaluation    Murphy is overall doing well.  He is clear for the procedure as scheduled.  No further work up is needed.  He was instructed not to smoke for 24 hrs before the procedure. Reviewed his medication and instruction as above. A written instruction was given. All of his questions were answered.         Signed Electronically by: Kimmie Camejo Mai, MD    Copy of this evaluation report is provided to requesting physician.    Interior Preop Guidelines    Revised Cardiac Risk Index

## 2019-10-14 NOTE — PATIENT INSTRUCTIONS
Before Your Surgery      Call your surgeon if there is any change in your health. This includes signs of a cold or flu (such as a sore throat, runny nose, cough, rash or fever).    Do not smoke, drink alcohol or take over the counter medicine (unless your surgeon or primary care doctor tells you to) for the 24 hours before and after surgery.    If you take prescribed drugs: Follow your doctor s orders about which medicines to take and which to stop until after surgery.    Eating and drinking prior to surgery: follow the instructions from your surgeon    Take a shower or bath the night before surgery. Use the soap your surgeon gave you to gently clean your skin. If you do not have soap from your surgeon, use your regular soap. Do not shave or scrub the surgery site.  Wear clean pajamas and have clean sheets on your bed.   Lung Cancer Screening   Frequently Asked Questions  If you are at high-risk for lung cancer, getting screened with low-dose computed tomography (LDCT) every year can help save your life. This handout offers answers to some of the most common questions about lung cancer screening. If you have other questions, please call [phone] ([phone]).     What is it?  Lung cancer screening uses special X-ray technology to create an image of your lung tissue. The exam is quick and easy and takes less than 10 seconds. We don t give you any medicine or use any needles. You can eat before and after the exam. You don t need to change your clothes as long as the clothing on your chest doesn t contain metal. But, you do need to be able to hold your breath for at least 6 seconds during the exam.    What is the goal of lung cancer screening?  The goal of lung cancer screening is to save lives. Many times, lung cancer is not found until a person starts having physical symptoms. Lung cancer screening can help detect lung cancer in the earliest stages when it may be easier to treat.    Who should be  screened for lung cancer?  We suggest lung cancer screening for anyone who is at high-risk for lung cancer. You are in the high-risk group if you:      are between the ages of 55 and 79, and    have smoked at least 1 pack of cigarettes a day for 30 or more years, and    still smoke or have quit within the past 15 years.    However, if you have a new cough or shortness of breath, you should talk to your doctor before being screened.    Some national lung health advocacy groups also recommend screening for people ages 50 to 79 who have smoked an average of 1 pack of cigarettes a day for 20 years. They must also have at least 1 other risk factor for lung cancer, not including exposure to secondhand smoke. Other risk factors are having had cancer in the past, emphysema, pulmonary fibrosis, COPD, a family history of lung cancer, or exposure to certain materials such as arsenic, asbestos, beryllium, cadmium, chromium, diesel fumes, nickel, radon or silica. Your care team can help you know if you have one of these risk factors.     Why does it matter if I have symptoms?  Certain symptoms can be a sign that you have a condition in your lungs that should be checked and treated by your doctor. These symptoms include fever, chest pain, a new or changing cough, shortness of breath that you have never felt before, coughing up blood or unexplained weight loss. Having any of these symptoms can greatly affect the results of lung cancer screening.       Should all smokers get an LDCT lung cancer screening exam?  It depends. Lung cancer screening is for a very specific group of men and women who have a history of heavy smoking over a long period of time (see  Who should be screened for lung cancer  above).  I am in the high-risk group, but have been diagnosed with cancer in the past. Is LDCT lung cancer screening right for me?  In some cases, you should not have LDCT lung screening, such as when your doctor is already following your  cancer with CT scan studies. Your doctor will help you decide if LDCT lung screening is right for you.  Do I need to have a screening exam every year?  Yes. If you are in the high-risk group described earlier, you should get an LDCT lung cancer screening exam every year until you are 79, or are no longer willing or able to undergo screening and possible procedures to diagnose and treat lung cancer.  How effective is LDCT at preventing death from lung cancer?  Studies have shown that LDCT lung cancer screening can lower the risk of death from lung cancer by 20 percent in people who are at high-risk.  What are the risks?  There are some risks and limitations of LDCT lung cancer screening. We want to make sure you understand the risks and benefits, so please let us know if you have any questions. Your doctor may want to talk with you more about these risks.    Radiation exposure: As with any exam that uses radiation, there is a very small increased risk of cancer. The amount of radiation in LDCT is small--about the same amount a person would get from a mammogram. Your doctor orders the exam when he or she feels the potential benefits outweigh the risks.    False negatives: No test is perfect, including LDCT. It is possible that you may have a medical condition, including lung cancer, that is not found during your exam. This is called a false negative result.    False positives and more testing: LDCT very often finds something in the lung that could be cancer, but in fact is not. This is called a false positive result. False positive tests often cause anxiety. To make sure these findings are not cancer, you may need to have more tests. These tests will be done only if you give us permission. Sometimes patients need a treatment that can have side effects, such as a biopsy. For more information on false positives, see  What can I expect from the results?     Findings not related to lung cancer: Your LDCT exam also takes  pictures of areas of your body next to your lungs. In a very small number of cases, the CT scan will show an abnormal finding in one of these areas, such as your kidneys, adrenal glands, liver or thyroid. This finding may not be serious, but you may need more tests. Your doctor can help you decide what other tests you may need, if any.  What can I expect from the results?  About 1 out of 4 LDCT exams will find something that may need more tests. Most of the time, these findings are lung nodules. Lung nodules are very small collections of tissue in the lung. These nodules are very common, and the vast majority--more than 97 percent--are not cancer (benign). Most are normal lymph nodes or small areas of scarring from past infections.  But, if a small lung nodule is found to be cancer, the cancer can be cured more than 90 percent of the time. To know if the nodule is cancer, we may need to get more images before your next yearly screening exam. If the nodule has suspicious features (for example, it is large, has an odd shape or grows over time), we will refer you to a specialist for further testing.  Will my doctor also get the results?  Yes. Your doctor will get a copy of your results.  Is it okay to keep smoking now that there s a cancer screening exam?  No. Tobacco is one of the strongest cancer-causing agents. It causes not only lung cancer, but other cancers and cardiovascular (heart) diseases as well. The damage caused by smoking builds over time. This means that the longer you smoke, the higher your risk of disease. While it is never too late to quit, the sooner you quit, the better.  Where can I find help to quit smoking?  The best way to prevent lung cancer is to stop smoking. If you have already quit smoking, congratulations and keep it up! For help on quitting smoking, please call [employer] at 6-598-357-UVQJ (6452) or the American Cancer Society at 1-209.562.8739 to find local resources near you.  One-on-one  health coaching:  If you d prefer to work individually with a health care provider on tobacco cessation, we offer:      Medication Therapy Management:  Our specially trained pharmacists work closely with you and your doctor to help you quit smoking.  Call [phone] or [phone] (toll free).     Can Do: Health coaching offered by [facility].  [email or web address]          Please take your medications as prescribed except.    No aspirin or NSAID (Ibuprofen, Aleve, Motrin, Naproxen, ect.) for 7 days before the procedure  Hold your prescribed med on am of procedure   Nothing to eat or drink before the procedure.    Lung Cancer Screening   Frequently Asked Questions  If you are at high-risk for lung cancer, getting screened with low-dose computed tomography (LDCT) every year can help save your life. This handout offers answers to some of the most common questions about lung cancer screening. If you have other questions, please call [phone] ([phone]).     What is it?  Lung cancer screening uses special X-ray technology to create an image of your lung tissue. The exam is quick and easy and takes less than 10 seconds. We don t give you any medicine or use any needles. You can eat before and after the exam. You don t need to change your clothes as long as the clothing on your chest doesn t contain metal. But, you do need to be able to hold your breath for at least 6 seconds during the exam.    What is the goal of lung cancer screening?  The goal of lung cancer screening is to save lives. Many times, lung cancer is not found until a person starts having physical symptoms. Lung cancer screening can help detect lung cancer in the earliest stages when it may be easier to treat.    Who should be screened for lung cancer?  We suggest lung cancer screening for anyone who is at high-risk for lung cancer. You are in the high-risk group if you:      are between the ages of 55 and 79, and     have smoked at least 1 pack of cigarettes a day for 30 or more years, and    still smoke or have quit within the past 15 years.    However, if you have a new cough or shortness of breath, you should talk to your doctor before being screened.    Some national lung health advocacy groups also recommend screening for people ages 50 to 79 who have smoked an average of 1 pack of cigarettes a day for 20 years. They must also have at least 1 other risk factor for lung cancer, not including exposure to secondhand smoke. Other risk factors are having had cancer in the past, emphysema, pulmonary fibrosis, COPD, a family history of lung cancer, or exposure to certain materials such as arsenic, asbestos, beryllium, cadmium, chromium, diesel fumes, nickel, radon or silica. Your care team can help you know if you have one of these risk factors.     Why does it matter if I have symptoms?  Certain symptoms can be a sign that you have a condition in your lungs that should be checked and treated by your doctor. These symptoms include fever, chest pain, a new or changing cough, shortness of breath that you have never felt before, coughing up blood or unexplained weight loss. Having any of these symptoms can greatly affect the results of lung cancer screening.       Should all smokers get an LDCT lung cancer screening exam?  It depends. Lung cancer screening is for a very specific group of men and women who have a history of heavy smoking over a long period of time (see  Who should be screened for lung cancer  above).  I am in the high-risk group, but have been diagnosed with cancer in the past. Is LDCT lung cancer screening right for me?  In some cases, you should not have LDCT lung screening, such as when your doctor is already following your cancer with CT scan studies. Your doctor will help you decide if LDCT lung screening is right for you.  Do I need to have a screening exam every year?  Yes. If you are in the high-risk group  described earlier, you should get an LDCT lung cancer screening exam every year until you are 79, or are no longer willing or able to undergo screening and possible procedures to diagnose and treat lung cancer.  How effective is LDCT at preventing death from lung cancer?  Studies have shown that LDCT lung cancer screening can lower the risk of death from lung cancer by 20 percent in people who are at high-risk.  What are the risks?  There are some risks and limitations of LDCT lung cancer screening. We want to make sure you understand the risks and benefits, so please let us know if you have any questions. Your doctor may want to talk with you more about these risks.    Radiation exposure: As with any exam that uses radiation, there is a very small increased risk of cancer. The amount of radiation in LDCT is small--about the same amount a person would get from a mammogram. Your doctor orders the exam when he or she feels the potential benefits outweigh the risks.    False negatives: No test is perfect, including LDCT. It is possible that you may have a medical condition, including lung cancer, that is not found during your exam. This is called a false negative result.    False positives and more testing: LDCT very often finds something in the lung that could be cancer, but in fact is not. This is called a false positive result. False positive tests often cause anxiety. To make sure these findings are not cancer, you may need to have more tests. These tests will be done only if you give us permission. Sometimes patients need a treatment that can have side effects, such as a biopsy. For more information on false positives, see  What can I expect from the results?     Findings not related to lung cancer: Your LDCT exam also takes pictures of areas of your body next to your lungs. In a very small number of cases, the CT scan will show an abnormal finding in one of these areas, such as your kidneys, adrenal glands, liver  or thyroid. This finding may not be serious, but you may need more tests. Your doctor can help you decide what other tests you may need, if any.  What can I expect from the results?  About 1 out of 4 LDCT exams will find something that may need more tests. Most of the time, these findings are lung nodules. Lung nodules are very small collections of tissue in the lung. These nodules are very common, and the vast majority--more than 97 percent--are not cancer (benign). Most are normal lymph nodes or small areas of scarring from past infections.  But, if a small lung nodule is found to be cancer, the cancer can be cured more than 90 percent of the time. To know if the nodule is cancer, we may need to get more images before your next yearly screening exam. If the nodule has suspicious features (for example, it is large, has an odd shape or grows over time), we will refer you to a specialist for further testing.  Will my doctor also get the results?  Yes. Your doctor will get a copy of your results.  Is it okay to keep smoking now that there s a cancer screening exam?  No. Tobacco is one of the strongest cancer-causing agents. It causes not only lung cancer, but other cancers and cardiovascular (heart) diseases as well. The damage caused by smoking builds over time. This means that the longer you smoke, the higher your risk of disease. While it is never too late to quit, the sooner you quit, the better.  Where can I find help to quit smoking?  The best way to prevent lung cancer is to stop smoking. If you have already quit smoking, congratulations and keep it up! For help on quitting smoking, please call [employer] at 3-768-490-WGJO (2976) or the American Cancer Society at 1-125.838.9440 to find local resources near you.  One-on-one health coaching:  If you d prefer to work individually with a health care provider on tobacco cessation, we offer:      Medication Therapy Management:  Our specially trained pharmacists work  closely with you and your doctor to help you quit smoking.  Call [phone] or [phone] (toll free).     Can Do: Health coaching offered by [facility].  [email or web address]

## 2019-10-14 NOTE — PROGRESS NOTES
Lung Cancer Screening Shared Decision Making Visit     Murphy Pratt is eligible for lung cancer screening on the basis of the information provided in my signed lung cancer screening order.     I have discussed with patient the risks and benefits of screening for lung cancer with low-dose CT.     The risks include:  radiation exposure: one low dose chest CT has as much ionizing radiation as about 15 chest x-rays or 6 months of background radiation living in [place]    false positives: 96% of positive findings/nodules are NOT cancer, but some might still require additional diagnostic evaluation, including biopsy  over-diagnosis: some slow growing cancers that might never have been clinically significant will be detected and treated unnecessarily     The benefit of early detection of lung cancer is contingent upon adherence to annual screening or more frequent follow up if indicated.     Furthermore, reaping the benefits of screening requires Murphy Pratt to be willing and physically able to undergo diagnostic procedures, if indicated. Although no specific guide is available for determining severity of comorbidities, it is reasonable to withhold screening in patients who have greater mortality risk from other diseases.     We did discuss that the only way to prevent lung cancer is to not smoke. Smoking cessation assistance was offered.    I did not offer risk estimation using a calculator such as this one:    He declined the screening test today    ShouldIScreen

## 2019-10-15 ENCOUNTER — TELEPHONE (OUTPATIENT)
Dept: FAMILY MEDICINE | Facility: CLINIC | Age: 64
End: 2019-10-15

## 2019-10-15 NOTE — LETTER
[facility] [address] [address] [phone]      October 18, 2019      Murphy Pratt  [address]  [facility] [address]      Dear Murphy:     To better serve you, we are sending this letter to notify you that we have attempted to contact you by telephone to schedule the following procedure(s) ordered by your physician.     __x_____   Colonoscopy   _______   Upper GI Endoscopy (EGD)   _______   Colonoscopy and Upper GI Endoscopy    To provide the highest quality of care, we strongly encourage you to call and schedule the prescribed test/procedure at your earliest convenience.   The number to the Specialty Scheduling department is [phone] and the hours are 8:00am - 4:30pm Monday through Friday.   We look forward to hearing from you.    Sincerely,    [facility]

## 2019-10-16 NOTE — TELEPHONE ENCOUNTER
Left message for patient to return call to schedule EGD/colonoscopy. If Tonya or Annika are not available, please transfer to same day surgery

## 2019-10-18 NOTE — TELEPHONE ENCOUNTER
Date of colonoscopy/EGD: 12/2  Surgeon: Dr. Brown  Prep:Miralax  Packet:Colonoscopy/EGD instructions mailed to patient's home address.   Date: 10/18/2019      Surgery Scheduler

## 2019-10-18 NOTE — TELEPHONE ENCOUNTER
Left message for patient to return call to schedule EGD/colonoscopy. If Tonya or Annika are not available, please transfer to same day surgery        x3 letter sent

## 2019-10-21 PROBLEM — N40.1 BENIGN PROSTATIC HYPERPLASIA WITH LOWER URINARY TRACT SYMPTOMS, SYMPTOM DETAILS UNSPECIFIED: Status: ACTIVE | Noted: 2019-09-26

## 2019-10-24 ENCOUNTER — ANESTHESIA EVENT (OUTPATIENT)
Dept: SURGERY | Facility: CLINIC | Age: 64
End: 2019-10-24
Payer: COMMERCIAL

## 2019-10-24 ENCOUNTER — ANESTHESIA (OUTPATIENT)
Dept: SURGERY | Facility: CLINIC | Age: 64
End: 2019-10-24
Payer: COMMERCIAL

## 2019-10-24 ENCOUNTER — HOSPITAL ENCOUNTER (OUTPATIENT)
Facility: CLINIC | Age: 64
Discharge: HOME | End: 2019-10-24
Attending: UROLOGY | Admitting: UROLOGY
Payer: COMMERCIAL

## 2019-10-24 VITALS
OXYGEN SATURATION: 92 % | HEIGHT: 71 IN | DIASTOLIC BLOOD PRESSURE: 79 MMHG | BODY MASS INDEX: 24.56 KG/M2 | RESPIRATION RATE: 16 BRPM | WEIGHT: 175.4 LBS | TEMPERATURE: 96.6 F | SYSTOLIC BLOOD PRESSURE: 131 MMHG | HEART RATE: 69 BPM

## 2019-10-24 DIAGNOSIS — N40.1 BENIGN PROSTATIC HYPERPLASIA WITH LOWER URINARY TRACT SYMPTOMS, SYMPTOM DETAILS UNSPECIFIED: ICD-10-CM

## 2019-10-24 DIAGNOSIS — R35.1 NOCTURIA: ICD-10-CM

## 2019-10-24 PROCEDURE — 40000306 ZZH STATISTIC PRE PROC ASSESS II: Performed by: UROLOGY

## 2019-10-24 PROCEDURE — 36000063 ZZH SURGERY LEVEL 4 EA 15 ADDTL MIN: Performed by: UROLOGY

## 2019-10-24 PROCEDURE — 37000008 ZZH ANESTHESIA TECHNICAL FEE, 1ST 30 MIN: Performed by: UROLOGY

## 2019-10-24 PROCEDURE — 25000125 ZZHC RX 250: Performed by: NURSE ANESTHETIST, CERTIFIED REGISTERED

## 2019-10-24 PROCEDURE — 52648 LASER SURGERY OF PROSTATE: CPT | Performed by: UROLOGY

## 2019-10-24 PROCEDURE — 25000125 ZZHC RX 250: Performed by: UROLOGY

## 2019-10-24 PROCEDURE — 25000566 ZZH SEVOFLURANE, EA 15 MIN: Performed by: UROLOGY

## 2019-10-24 PROCEDURE — 37000009 ZZH ANESTHESIA TECHNICAL FEE, EACH ADDTL 15 MIN: Performed by: UROLOGY

## 2019-10-24 PROCEDURE — 36000093 ZZH SURGERY LEVEL 4 1ST 30 MIN: Performed by: UROLOGY

## 2019-10-24 PROCEDURE — 25800030 ZZH RX IP 258 OP 636: Performed by: NURSE ANESTHETIST, CERTIFIED REGISTERED

## 2019-10-24 PROCEDURE — 25000128 H RX IP 250 OP 636: Performed by: NURSE ANESTHETIST, CERTIFIED REGISTERED

## 2019-10-24 PROCEDURE — 27210794 ZZH OR GENERAL SUPPLY STERILE: Performed by: UROLOGY

## 2019-10-24 PROCEDURE — 25000128 H RX IP 250 OP 636: Performed by: UROLOGY

## 2019-10-24 PROCEDURE — 71000014 ZZH RECOVERY PHASE 1 LEVEL 2 FIRST HR: Performed by: UROLOGY

## 2019-10-24 PROCEDURE — 71000027 ZZH RECOVERY PHASE 2 EACH 15 MINS: Performed by: UROLOGY

## 2019-10-24 RX ORDER — FINASTERIDE 5 MG/1
5 TABLET, FILM COATED ORAL DAILY
Qty: 90 TABLET | Refills: 3 | Status: SHIPPED | OUTPATIENT
Start: 2019-10-24 | End: 2024-08-02

## 2019-10-24 RX ORDER — CIPROFLOXACIN 500 MG/1
500 TABLET, FILM COATED ORAL 2 TIMES DAILY
Qty: 6 TABLET | Refills: 0 | Status: SHIPPED | OUTPATIENT
Start: 2019-10-24 | End: 2019-10-27

## 2019-10-24 RX ORDER — HYDROCODONE BITARTRATE AND ACETAMINOPHEN 5; 325 MG/1; MG/1
1-2 TABLET ORAL EVERY 4 HOURS PRN
Status: DISCONTINUED | OUTPATIENT
Start: 2019-10-24 | End: 2019-10-24 | Stop reason: HOSPADM

## 2019-10-24 RX ORDER — LIDOCAINE HYDROCHLORIDE 20 MG/ML
INJECTION, SOLUTION INFILTRATION; PERINEURAL PRN
Status: DISCONTINUED | OUTPATIENT
Start: 2019-10-24 | End: 2019-10-24

## 2019-10-24 RX ORDER — DIMENHYDRINATE 50 MG/ML
25 INJECTION, SOLUTION INTRAMUSCULAR; INTRAVENOUS
Status: DISCONTINUED | OUTPATIENT
Start: 2019-10-24 | End: 2019-10-24 | Stop reason: HOSPADM

## 2019-10-24 RX ORDER — NALOXONE HYDROCHLORIDE 0.4 MG/ML
.1-.4 INJECTION, SOLUTION INTRAMUSCULAR; INTRAVENOUS; SUBCUTANEOUS
Status: DISCONTINUED | OUTPATIENT
Start: 2019-10-24 | End: 2019-10-24 | Stop reason: HOSPADM

## 2019-10-24 RX ORDER — ONDANSETRON 2 MG/ML
4 INJECTION INTRAMUSCULAR; INTRAVENOUS EVERY 30 MIN PRN
Status: DISCONTINUED | OUTPATIENT
Start: 2019-10-24 | End: 2019-10-24 | Stop reason: HOSPADM

## 2019-10-24 RX ORDER — HYDROCODONE BITARTRATE AND ACETAMINOPHEN 5; 325 MG/1; MG/1
1-2 TABLET ORAL EVERY 4 HOURS PRN
Qty: 15 TABLET | Refills: 0 | Status: SHIPPED | OUTPATIENT
Start: 2019-10-24 | End: 2024-08-02

## 2019-10-24 RX ORDER — SODIUM CHLORIDE, SODIUM LACTATE, POTASSIUM CHLORIDE, CALCIUM CHLORIDE 600; 310; 30; 20 MG/100ML; MG/100ML; MG/100ML; MG/100ML
INJECTION, SOLUTION INTRAVENOUS CONTINUOUS
Status: DISCONTINUED | OUTPATIENT
Start: 2019-10-24 | End: 2019-10-24 | Stop reason: HOSPADM

## 2019-10-24 RX ORDER — ATROPA BELLADONNA AND OPIUM 16.2; 3 MG/1; MG/1
30 SUPPOSITORY RECTAL EVERY 6 HOURS PRN
Status: DISCONTINUED | OUTPATIENT
Start: 2019-10-24 | End: 2019-10-24 | Stop reason: HOSPADM

## 2019-10-24 RX ORDER — LIDOCAINE 40 MG/G
CREAM TOPICAL
Status: DISCONTINUED | OUTPATIENT
Start: 2019-10-24 | End: 2019-10-24 | Stop reason: HOSPADM

## 2019-10-24 RX ORDER — FENTANYL CITRATE 50 UG/ML
INJECTION, SOLUTION INTRAMUSCULAR; INTRAVENOUS PRN
Status: DISCONTINUED | OUTPATIENT
Start: 2019-10-24 | End: 2019-10-24

## 2019-10-24 RX ORDER — HYDROMORPHONE HYDROCHLORIDE 1 MG/ML
.3-.5 INJECTION, SOLUTION INTRAMUSCULAR; INTRAVENOUS; SUBCUTANEOUS EVERY 10 MIN PRN
Status: DISCONTINUED | OUTPATIENT
Start: 2019-10-24 | End: 2019-10-24 | Stop reason: HOSPADM

## 2019-10-24 RX ORDER — DEXAMETHASONE SODIUM PHOSPHATE 10 MG/ML
INJECTION, SOLUTION INTRAMUSCULAR; INTRAVENOUS PRN
Status: DISCONTINUED | OUTPATIENT
Start: 2019-10-24 | End: 2019-10-24

## 2019-10-24 RX ORDER — FENTANYL CITRATE 50 UG/ML
25-50 INJECTION, SOLUTION INTRAMUSCULAR; INTRAVENOUS
Status: DISCONTINUED | OUTPATIENT
Start: 2019-10-24 | End: 2019-10-24 | Stop reason: HOSPADM

## 2019-10-24 RX ORDER — CEFAZOLIN SODIUM 2 G/100ML
2 INJECTION, SOLUTION INTRAVENOUS
Status: COMPLETED | OUTPATIENT
Start: 2019-10-24 | End: 2019-10-24

## 2019-10-24 RX ORDER — PROPOFOL 10 MG/ML
INJECTION, EMULSION INTRAVENOUS PRN
Status: DISCONTINUED | OUTPATIENT
Start: 2019-10-24 | End: 2019-10-24

## 2019-10-24 RX ORDER — ONDANSETRON 4 MG/1
4 TABLET, ORALLY DISINTEGRATING ORAL EVERY 30 MIN PRN
Status: DISCONTINUED | OUTPATIENT
Start: 2019-10-24 | End: 2019-10-24 | Stop reason: HOSPADM

## 2019-10-24 RX ORDER — ONDANSETRON 2 MG/ML
INJECTION INTRAMUSCULAR; INTRAVENOUS PRN
Status: DISCONTINUED | OUTPATIENT
Start: 2019-10-24 | End: 2019-10-24

## 2019-10-24 RX ORDER — CEFAZOLIN SODIUM 1 G/3ML
1 INJECTION, POWDER, FOR SOLUTION INTRAMUSCULAR; INTRAVENOUS SEE ADMIN INSTRUCTIONS
Status: DISCONTINUED | OUTPATIENT
Start: 2019-10-24 | End: 2019-10-24 | Stop reason: HOSPADM

## 2019-10-24 RX ADMIN — LIDOCAINE HYDROCHLORIDE 80 MG: 20 INJECTION, SOLUTION INFILTRATION; PERINEURAL at 11:55

## 2019-10-24 RX ADMIN — DEXAMETHASONE SODIUM PHOSPHATE 10 MG: 10 INJECTION, SOLUTION INTRAMUSCULAR; INTRAVENOUS at 12:23

## 2019-10-24 RX ADMIN — FENTANYL CITRATE 50 MCG: 50 INJECTION, SOLUTION INTRAMUSCULAR; INTRAVENOUS at 11:49

## 2019-10-24 RX ADMIN — LIDOCAINE HYDROCHLORIDE: 10 INJECTION, SOLUTION EPIDURAL; INFILTRATION; INTRACAUDAL; PERINEURAL at 11:20

## 2019-10-24 RX ADMIN — MIDAZOLAM 2 MG: 1 INJECTION INTRAMUSCULAR; INTRAVENOUS at 11:49

## 2019-10-24 RX ADMIN — CEFAZOLIN SODIUM 2 G: 2 INJECTION, SOLUTION INTRAVENOUS at 12:12

## 2019-10-24 RX ADMIN — ROCURONIUM BROMIDE 30 MG: 10 INJECTION INTRAVENOUS at 12:12

## 2019-10-24 RX ADMIN — PROPOFOL 60 MG: 10 INJECTION, EMULSION INTRAVENOUS at 11:59

## 2019-10-24 RX ADMIN — FLUORESCEIN SODIUM 5 ML: 100 INJECTION, SOLUTION OPHTHALMIC at 12:55

## 2019-10-24 RX ADMIN — FENTANYL CITRATE 50 MCG: 50 INJECTION, SOLUTION INTRAMUSCULAR; INTRAVENOUS at 12:28

## 2019-10-24 RX ADMIN — ATROPA BELLADONNA AND OPIUM 1 SUPPOSITORY: 16.2; 3 SUPPOSITORY RECTAL at 16:10

## 2019-10-24 RX ADMIN — FENTANYL CITRATE 50 MCG: 50 INJECTION, SOLUTION INTRAMUSCULAR; INTRAVENOUS at 12:52

## 2019-10-24 RX ADMIN — FENTANYL CITRATE 50 MCG: 50 INJECTION, SOLUTION INTRAMUSCULAR; INTRAVENOUS at 13:29

## 2019-10-24 RX ADMIN — PROPOFOL 60 MG: 10 INJECTION, EMULSION INTRAVENOUS at 11:57

## 2019-10-24 RX ADMIN — SODIUM CHLORIDE, POTASSIUM CHLORIDE, SODIUM LACTATE AND CALCIUM CHLORIDE: 600; 310; 30; 20 INJECTION, SOLUTION INTRAVENOUS at 11:20

## 2019-10-24 RX ADMIN — PROPOFOL 160 MG: 10 INJECTION, EMULSION INTRAVENOUS at 11:55

## 2019-10-24 RX ADMIN — ONDANSETRON 4 MG: 2 INJECTION INTRAMUSCULAR; INTRAVENOUS at 12:23

## 2019-10-24 RX ADMIN — Medication 100 MG: at 11:59

## 2019-10-24 RX ADMIN — SUGAMMADEX 200 MG: 100 INJECTION, SOLUTION INTRAVENOUS at 13:43

## 2019-10-24 ASSESSMENT — MIFFLIN-ST. JEOR: SCORE: 1609.32

## 2019-10-24 ASSESSMENT — LIFESTYLE VARIABLES: TOBACCO_USE: 1

## 2019-10-24 NOTE — BRIEF OP NOTE
[facility]  Brief Operative Note    Pre-operative diagnosis: bph   Post-operative diagnosis same   Procedure: Procedure(s):  TRANSURETHRAL RESECTION (TUR) PROSTATE, USING KTP LASER   Surgeon: Jake Burleson MD   Assistants(s): None   Anesthesia: General    Estimated blood loss: minimal                   Specimens: None   Implants: None       Complications: None   Condition on discharge: Stable   Findings: Very large prostate  See dictated operative report for full details

## 2019-10-24 NOTE — ANESTHESIA PREPROCEDURE EVALUATION
Anesthesia Pre-Procedure Evaluation    Patient: Murphy Pratt   MRN: [id number] : 1955          Preoperative Diagnosis: Benign prostatic hyperplasia with lower urinary tract symptoms, symptom details unspecified [N40.1]    Procedure(s):  TRANSURETHRAL RESECTION (TUR) PROSTATE, USING KTP LASER    Past Medical History:   Diagnosis Date     Personal history of alcoholism (H)     Treatment in      Past Surgical History:   Procedure Laterality Date     COLONOSCOPY  2006     [facility] VASECTOMY UNILAT/BILAT W POSTOP SEMEN      Vasectomy     HERNIORRHAPHY INGUINAL Left 2017    Procedure: HERNIORRHAPHY INGUINAL;  Surgeon: Rand Ricketts MD;  Location: [facility] OR       Anesthesia Evaluation     . Pt has had prior anesthetic. Type: General and MAC    No history of anesthetic complications          ROS/MED HX    ENT/Pulmonary:     (+)tobacco use, Current use 2 for 50 years packs/day  , . .    Neurologic:     (+)dementia,     Cardiovascular:  - neg cardiovascular ROS   (+) ----. : . . . :. . No previous cardiac testing       METS/Exercise Tolerance:  >4 METS   Hematologic:  - neg hematologic  ROS       Musculoskeletal:  - neg musculoskeletal ROS       GI/Hepatic:  - neg GI/hepatic ROS       Renal/Genitourinary:     (+) BPH,       Endo:  - neg endo ROS       Psychiatric:  - neg psychiatric ROS       Infectious Disease:  - neg infectious disease ROS       Malignancy:      - no malignancy   Other: Comment: H/o alcohol abuse - treatment in    - neg other ROS                      Physical Exam  Normal systems: cardiovascular and pulmonary    Airway   Mallampati: II  TM distance: >3 FB  Neck ROM: full    Dental   (+) upper dentures and lower dentures    Cardiovascular   Rhythm and rate: regular and normal      Pulmonary    breath sounds clear to auscultation            Lab Results   Component Value Date    WBC 6.8 10/28/2016    HGB 15.4 10/28/2016    HCT 46.3 10/28/2016     10/28/2016      "10/28/2016    POTASSIUM 4.0 10/28/2016    CHLORIDE 107 10/28/2016    CO2 30 10/28/2016    BUN 13 10/28/2016    CR 0.82 10/28/2016    GLC 89 10/28/2016    PADMA 8.3 (L) 10/28/2016    ALBUMIN 3.8 10/28/2016    PROTTOTAL 6.9 10/28/2016    ALT 24 10/28/2016    AST 20 10/28/2016    ALKPHOS 54 10/28/2016    BILITOTAL 0.5 10/28/2016    TSH 1.71 10/28/2016       Preop Vitals  BP Readings from Last 3 Encounters:   10/14/19 132/68   12/09/18 146/82   10/18/18 148/77    Pulse Readings from Last 3 Encounters:   10/14/19 81   12/09/18 73   10/18/18 62      Resp Readings from Last 3 Encounters:   10/14/19 16   06/08/18 16   11/02/17 20    SpO2 Readings from Last 3 Encounters:   10/14/19 93%   12/09/18 98%   10/18/18 96%      Temp Readings from Last 1 Encounters:   10/14/19 97.3  F (36.3  C) (Temporal)    Ht Readings from Last 1 Encounters:   10/14/19 1.806 m (5' 11.1\")      Wt Readings from Last 1 Encounters:   10/14/19 79.6 kg (175 lb 6.4 oz)    Estimated body mass index is 24.39 kg/m  as calculated from the following:    Height as of 10/14/19: 1.806 m (5' 11.1\").    Weight as of 10/14/19: 79.6 kg (175 lb 6.4 oz).       Anesthesia Plan      History & Physical Review  History and physical reviewed and following examination; no interval change.    ASA Status:  2 .    NPO Status:  > 6 hours    Plan for General and LMA with Intravenous and Propofol induction. Maintenance will be Balanced.    PONV prophylaxis:  Ondansetron (or other 5HT-3) and Dexamethasone or Solumedrol  All available and pertinent medical records and test results reviewed.    Risks, including but not limited to airway injury, bronchospasm, hypoxemia, PONV discussed with patient    Patient evaluated and examined prior to procedure, questions, concerns addressed and answered.        Postoperative Care  Postoperative pain management:  IV analgesics, Oral pain medications and Multi-modal analgesia.      Consents  Anesthetic plan, risks, benefits and alternatives " discussed with:  Patient.  Use of blood products discussed: No .   .                 TICO Wong CRNA

## 2019-10-24 NOTE — ANESTHESIA POSTPROCEDURE EVALUATION
Patient: Murphy Pratt    Procedure(s):  TRANSURETHRAL RESECTION (TUR) PROSTATE, USING KTP LASER    Diagnosis:Benign prostatic hyperplasia with lower urinary tract symptoms, symptom details unspecified [N40.1]  Diagnosis Additional Information: No value filed.    Anesthesia Type:  General, LMA    Note:  Anesthesia Post Evaluation    Patient location during evaluation: Phase 2  Patient participation: Able to fully participate in evaluation  Level of consciousness: awake and alert  Pain management: adequate  Airway patency: patent  Cardiovascular status: acceptable and stable  Respiratory status: acceptable and room air  Hydration status: acceptable  PONV: none     Anesthetic complications: None    Comments:  Patient was happy with the anesthesia care received and no anesthesia related complications were noted.  I will follow up with the patient again if it is needed.        Last vitals:  Vitals:    10/24/19 1450 10/24/19 1455 10/24/19 1500   BP: 132/81 125/81 123/89   Pulse: 72  76   Resp: 17     Temp: 96.6  F (35.9  C)     SpO2: 99%  (!) 89%         Electronically Signed By: TICO Wong CRNA  October 24, 2019  3:13 PM

## 2019-10-24 NOTE — DISCHARGE INSTRUCTIONS
Discharge Instructions following   Transurethral Resection of the Prostate (TURP)  [facility]  Dr. Burleson  Diet:    Diet as tolerated.    Drink at least 6-8 glasses of liquid a day.  At least 3 glasses should be water.  Activity:    Avoid heavy lifting and running.  Check at your first follow-up appointment for increased activity.    Short walks and stair climbing are ok.    Showering is ok.  Care after surgery:    Do not hold urine in your bladder.  Always empty your bladder when you have the urge to urinate.    Do not strain to have a bowel movement.  If you are constipated, take an over-the-counter stool softener until stools become normal or soft.  This may take several days.    Do not drive a car or have intercourse until cleared by your doctor.    It is not unusual to pass small clots or have red-tinged urine.  If this occurs, decrease activity, and increase your fluid intake.  Generally, the urine will clear of blood within a day or two.  Notify your surgeon for the following signs and symptoms: [phone]    Painful urination or inability to urinate.    Loss of bladder control or increase frequency of voiding.    Chills or a temperature greater than 101 oF.    You may expect to have some blood for at least 3-4 weeks, particularly at the beginning or end of urination. If there is dark, thick blood with difficulty urinating, call your surgeon.      With any questions or concerns.    24 hour [facility] nurse line: [phone]  [facility]   Adult Discharge Orders & Instructions     For 24 hours after surgery    1. Get plenty of rest.  A responsible adult must stay with you for at least 24 hours after you leave the hospital.   2. Do not drive or use heavy equipment.  If you have weakness or tingling, don't drive or use heavy equipment until this feeling goes away.  3. Do not drink alcohol.  4. Avoid strenuous or risky activities.  Ask for help when climbing stairs.   5. You may feel  lightheaded.  IF so, sit for a few minutes before standing.  Have someone help you get up.   6. If you have nausea (feel sick to your stomach): Drink only clear liquids such as apple juice, ginger ale, broth or 7-Up.  Rest may also help.  Be sure to drink enough fluids.  Move to a regular diet as you feel able.  7. You may have a slight fever. Call the doctor if your fever is over 100 F (37.7 C) (taken under the tongue) or lasts longer than 24 hours.  8. You may have a dry mouth, a sore throat, muscle aches or trouble sleeping.  These should go away after 24 hours.  9. Do not make important or legal decisions.   Call your doctor for any of the followin.  Signs of infection (fever, growing tenderness at the surgery site, a large amount of drainage or bleeding, severe pain, foul-smelling drainage, redness, swelling).    2. It has been over 8 to 10 hours since thompson catheter removal and you are still not able to urinate (pass water).    To contact a nurse , call ____[phone]____________________________________

## 2019-10-24 NOTE — ANESTHESIA CARE TRANSFER NOTE
Patient: Murphy Pratt    Procedure(s):  TRANSURETHRAL RESECTION (TUR) PROSTATE, USING KTP LASER    Diagnosis: Benign prostatic hyperplasia with lower urinary tract symptoms, symptom details unspecified [N40.1]  Diagnosis Additional Information: No value filed.    Anesthesia Type:   General, LMA     Note:  Airway :Face Mask  Patient transferred to:PACU  Handoff Report: Identifed the Patient, Identified the Reponsible Provider, Reviewed the pertinent medical history, Discussed the surgical course, Reviewed Intra-OP anesthesia mangement and issues during anesthesia, Set expectations for post-procedure period and Allowed opportunity for questions and acknowledgement of understanding      Vitals: (Last set prior to Anesthesia Care Transfer)    CRNA VITALS  10/24/2019 1322 - 10/24/2019 1357      10/24/2019             Pulse:  93    SpO2:  100 %                Electronically Signed By: TICO Wong CRNA  October 24, 2019  1:57 PM

## 2019-10-24 NOTE — OP NOTE
PREOPERATIVE DIAGNOSIS:  Benign prostatic hyperplasia with obstruction      POSTOPERATIVE DIAGNOSIS:  Benign prostatic hyperplasia with obstruction      PROCEDURE DONE:  XPS laser vaporization of prostate.       DESCRIPTION OF PROCEDURE:  He was brought to the operating room and after general anesthesia was induced, he was placed in the dorsal lithotomy position.  He was draped and prepped in the usual sterile surgical fashion.  Preop enema was given.  A cystoscope was then placed into the bladder.  The patient has trilobar prostatic obstruction with the lateral lobes measured about 4 cm in length.  Using the XPS laser fiber, I proceeded to vaporize the prostate from the bladder neck toward the verumontanum.  A total of [id number] joules of energy was used which is twice for most cases.  At the end of the procedure, the prostatic fossa was wide open.  A 24-Hungarian 3-way catheter was then placed.  The patient tolerated the procedure well.  There were no complications identified during the procedure.  There was minimal bleeding during the procedure.           OBI PINEDA MD

## 2019-10-24 NOTE — OR NURSING
Assumed patient care after verbal report from Jennifer COVARRUBIAS RN.  Patient currently sleeping.  Wife at bedside.

## 2019-10-25 NOTE — PROGRESS NOTES
[facility]  Discharge Call Back  Murphy Pratt  1955  MRN: [id number]  Home: [phone] (home)   PCP: Kimmie Contreras    We are calling to see how you're doing since your surgery/procedure with us?   Comments: Doing good  Clinical Questions  1. Have you had time to look at your discharge instructions? Do you have any questions in regards to the instructions?   Comment: yes, no  2. Do you feel your pain is being controlled with the regimen the surgeon sent you home on? (ie: prescription medications, over the counter pain medications, ice packs)   Comments: yes  3. Have you noticed any drainage on your dressing? Do you know what to do if you have bleeding as a result of your procedure?   Comments: no  4. Have you had any nausea/vomiting? Do you know how to treat this?   Comment: no  5. Have you had any signs/symptoms of infection? (ie: fever, swelling, heat, drainage or redness) Do you know what to do if you have?   Comment: no  6. Do you have a follow up appointment made with your surgeon? Do you have a number to contact them at if you need it?   Comment: yes, yes  Retained Foreign Object (GABY, Hemovac, Penrose, Wound Packing, Vaginal Packing, Nasal Splints, Urethral Stents, Thompson Catheter)  1. Do you still have thompson in place? yes  2. If the item is still in place, can you review the plan for removal with me? Will take out as soon as wife gets home today      You may be randomly selected to fill out a [facility] survey. We would appreciate you taking the time to fill this out. It is important to us if you would answer all of the questions on the survey.

## 2019-10-31 ENCOUNTER — OFFICE VISIT (OUTPATIENT)
Dept: UROLOGY | Facility: OTHER | Age: 64
End: 2019-10-31
Payer: COMMERCIAL

## 2019-10-31 VITALS
DIASTOLIC BLOOD PRESSURE: 87 MMHG | SYSTOLIC BLOOD PRESSURE: 139 MMHG | HEART RATE: 68 BPM | RESPIRATION RATE: 16 BRPM | OXYGEN SATURATION: 95 % | TEMPERATURE: 97.8 F

## 2019-10-31 DIAGNOSIS — N40.1 BENIGN PROSTATIC HYPERPLASIA WITH LOWER URINARY TRACT SYMPTOMS, SYMPTOM DETAILS UNSPECIFIED: Primary | ICD-10-CM

## 2019-10-31 PROCEDURE — 99024 POSTOP FOLLOW-UP VISIT: CPT | Performed by: UROLOGY

## 2019-10-31 ASSESSMENT — PAIN SCALES - GENERAL: PAINLEVEL: NO PAIN (0)

## 2019-10-31 NOTE — PROGRESS NOTES
Chief Complaint   Patient presents with     Surgical Followup     TURP       Murphy Pratt is a 64 year old male who presents today for follow up of   Chief Complaint   Patient presents with     Surgical Followup     TURP   Patient is here for follow-up of his BPH status post recently.  He is doing well without any complaints of his urinary flow improved but his bleeding has stopped.  Overall he is quite happy with the results.    Current Outpatient Medications   Medication Sig Dispense Refill     finasteride (PROSCAR) 5 MG tablet Take 1 tablet (5 mg) by mouth daily 90 tablet 3     Multiple Vitamins-Minerals (MULTIVITAMIN & MINERAL PO)        VITAMIN D, CHOLECALCIFEROL, PO Take by mouth daily       Fexofenadine HCl (ALLEGRA PO)        HYDROcodone-acetaminophen (NORCO) 5-325 MG tablet Take 1-2 tablets by mouth every 4 hours as needed for moderate to severe pain (Patient not taking: Reported on 10/31/2019) 15 tablet 0     Allergies   Allergen Reactions     Penicillins Hives     Fever T: 104 F       Past Medical History:   Diagnosis Date     Personal history of alcoholism (H)     Treatment in      Past Surgical History:   Procedure Laterality Date     COLONOSCOPY  2006     HC VASECTOMY UNILAT/BILAT W POSTOP SEMEN      Vasectomy     HERNIORRHAPHY INGUINAL Left 2017    Procedure: HERNIORRHAPHY INGUINAL;  Surgeon: Rand Ricketts MD;  Location: [facility] OR     LASER KTP TRANSURETHRAL RESECTION (TUR) PROSTATE N/A 10/24/2019    Procedure: TRANSURETHRAL RESECTION (TUR) PROSTATE, USING KTP LASER;  Surgeon: Jake Burleson MD;  Location: [facility] OR     Family History   Problem Relation Age of Onset     Alcohol/Drug Mother      Substance Abuse Mother         alcoholism     Alcohol/Drug Father      Heart Disease Father          at 51     Asthma Brother      No Known Problems Brother      No Known Problems Brother      No Known Problems Sister      No Known Problems Sister      Unknown/Adopted Maternal  [facility]  Progress Note    Patient Name: Magaly Nunes  MRN: [id number]  Patient Class: IP- Inpatient   Admission Date: 3/19/2019  Length of Stay: 1 days  Attending Physician: Claudia Watts MD  Primary Care Provider: Chris Bangura MD        Subjective:     Principal Problem:Persistent atrial fibrillation    HPI:  86 year old female with coronary artery disease, diastolic heart failure and CKD stage 3 who presented with left sided chest pain of one day in evolution. Patient stated it felt like she couldn't breath. States pain is worse when she tries to take a deep breath. Denied radiation of pain. Endorses shortness of breath on exertion and worsening LE edema. States she is on diuretics but has not taken lately due to issues with incontinence. States compliance with low sodium diet but does not usually cook for herself. Denied fever, chills, rash, nausea, vomiting, abd pain, diarrhea, constipation.     In the ED, patient was tachycardic to low 100s but afebrile, normotensive and initially with adequate sats at room air. Did become hypoxic to 80% at one point but not sure if this was with ambulation. Will look into this. Has slight leukopenia, INR 1.4, AST and TBil slightly elevated, trop 0.086 and BNP of 1925.  EKG showed atrial fibrillation. CTA chest negative for PE but showed pleural effusion. BLE ultrasound negative for DVT. Patient admitted for trop trend, heart failure exacerbation and Cardiology consultation for newly diagnosed AFib.    Hospital Course:  No notes on file    Interval History: now in NSR but feels more SOB and has a cough. No chest pain    Review of Systems   Respiratory: Positive for cough and shortness of breath.    Cardiovascular: Negative.    Gastrointestinal: Negative.      Objective:     Vital Signs (Most Recent):  Temp: 98.2 °F (36.8 °C) (03/20/19 1115)  Pulse: 77 (03/20/19 1115)  Resp: 19 (03/20/19 1115)  BP: (!) 150/67 (03/20/19  1115)  SpO2: 96 % (03/20/19 1115) Vital Signs (24h Range):  Temp:  [97.5 °F (36.4 °C)-99 °F (37.2 °C)] 98.2 °F (36.8 °C)  Pulse:  [] 77  Resp:  [17-27] 19  SpO2:  [92 %-100 %] 96 %  BP: (138-167)/(64-99) 150/67     Weight: 84.5 kg (186 lb 4.6 oz)  Body mass index is 33 kg/m².    Intake/Output Summary (Last 24 hours) at 3/20/2019 1253  Last data filed at 3/20/2019 0600  Gross per 24 hour   Intake --   Output 500 ml   Net -500 ml      Physical Exam   Constitutional: She is oriented to person, place, and time. She appears well-developed. No distress.   Well appearing   Cardiovascular: Normal rate and regular rhythm.   Pulmonary/Chest: Effort normal and breath sounds normal. She has no wheezes. She has no rales.   Speaking full sentences and breathing comfortably at room air   Abdominal: Soft. Bowel sounds are normal. She exhibits no distension. There is no tenderness.   Musculoskeletal: Normal range of motion. She exhibits edema (BLE).   Neurological: She is alert and oriented to person, place, and time.   Skin: Skin is warm and dry. Capillary refill takes less than 2 seconds. She is not diaphoretic.   Psychiatric: She has a normal mood and affect. Her behavior is normal. Judgment and thought content normal.   Nursing note and vitals reviewed.      Significant Labs: All pertinent labs within the past 24 hours have been reviewed.    Significant Imaging: I have reviewed all pertinent imaging results/findings within the past 24 hours.  I have reviewed and interpreted all pertinent imaging results/findings within the past 24 hours.    Assessment/Plan:      * Persistent atrial fibrillation    New onset  Now NSR with oral amiodarone therefore cardioversion aborted  On NOAC for stroke proph (discussed benefits of anticoagulation with patient. Also discussed risk for bleeding. Denied Hx of bleeding. Agreed with taking NOAC for stroke prophylaxis. Per cardiology, ok to stop ASA and continue clopidogrel to minimize risk  Grandmother      Unknown/Adopted Maternal Grandfather      Unknown/Adopted Paternal Grandmother      Unknown/Adopted Paternal Grandfather      No Known Problems Son      Family History Negative No family hx of      Social History     Socioeconomic History     Marital status:      Spouse name: None     Number of children: None     Years of education: None     Highest education level: None   Occupational History     None   Social Needs     Financial resource strain: None     Food insecurity:     Worry: None     Inability: None     Transportation needs:     Medical: None     Non-medical: None   Tobacco Use     Smoking status: Current Every Day Smoker     Packs/day: 2.00     Years: 47.00     Pack years: 94.00     Types: Cigarettes     Smokeless tobacco: Never Used     Tobacco comment: 2 ppd, started age 15   Substance and Sexual Activity     Alcohol use: No     Alcohol/week: 0.0 standard drinks     Comment: quit in 1993     Drug use: No     Sexual activity: Yes     Partners: Female     Birth control/protection: Surgical, Male Surgical     Comment: 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   Lifestyle     Physical activity:     Days per week: None     Minutes per session: None     Stress: None   Relationships     Social connections:     Talks on phone: None     Gets together: None     Attends [employer] service: None     Active member of club or organization: None     Attends meetings of clubs or organizations: None     Relationship status: None     Intimate partner violence:     Fear of current or ex partner: None     Emotionally abused: None     Physically abused: None     Forced sexual activity: None   Other Topics Concern     Parent/sibling w/ CABG, MI or angioplasty before 65F 55M? Not Asked   Social History Narrative     None       REVIEW OF SYSTEMS  =================  C: NEGATIVE for fever, chills, change in weight  I: NEGATIVE for worrisome rashes, moles or lesions  E/M: NEGATIVE for  "for bleeding)  Echo with LVEF of 30% which is lower then previous. Also with grade 3 diasto dysf  Cardiac monitoring           Acute on chronic combined systolic and diastolic heart failure    Has grade 3 diastolic dysfunction. Repeat Echo with worsened LVEF of 30%  Mild exacerbation 2/2 to medication non compliance  CT chest with "moderate" right pleural effusion? She has breath sounds at right base on my exam, maybe some crackles rather than absent sounds. States feeling more SOB today despite use of IV lasix yesterday. She may need another dose. Will obtain CXR prior to repeat IV dose.   O2 sat stable at room air currently  On home diuretic regimen (lasix + spironolactone) and HR currently NSR  BMP in AM         Elevated troponin I level    Mild elevation but sure at risk  Treat BP and new onset AFib  No need for further trending at this time  On cardioprotective regimen  Is chest pain free       Elevated brain natriuretic peptide (BNP) level    2/2 volume overload  On diuretic treatment       Serum total bilirubin elevated    abd exam benign  Likely from heart failure  CMP in AM       Leukopenia    Reactive? No signs of infection and is well appearing  Resolved spontaneously          Chest pain    Described pain sounds more like MSK  No evidence of trauma, pneumothorax, consolitaion or fracture at affected area.   Mild elevation of troponin not consistent with ACS  States chest pain resolved  On cardioprotective regimen        Essential hypertension    Currently at goal  On home regimen         VTE Risk Mitigation (From admission, onward)        Ordered     apixaban tablet 2.5 mg  2 times daily      03/20/19 9799              Claudia Christina MD  Department of Hospital Medicine   [facility]  " ear, mouth and throat problems  R: NEGATIVE for significant cough or SHORTNESS OF BREATH  CV:  NEGATIVE for chest pain, palpitations or peripheral edema  GI: NEGATIVE for nausea, abdominal pain, heartburn, or change in bowel habits  NEURO: NEGATIVE numbness/weakness  : see HPI  PSYCH: NEGATIVE depression/anxiety  LYmph: no new enlarged lymph nodes  Ortho: no new trauma/movements    Physical Exam:  /87 (BP Location: Right arm, Patient Position: Sitting, Cuff Size: Adult Regular)   Pulse 68   Temp 97.8  F (36.6  C) (Oral)   Resp 16   SpO2 95%    Patient is pleasant, in no acute distress, good general condition.  Lung: no evidence of respiratory distress    Abdomen: Soft, nondistended, non tender. No masses. No rebound or guarding.   Exam: No CVA tenderness  Skin: Warm and dry.  No redness.  Psych: normal mood and affect  Neuro: alert and oriented  Musculaskeletal: moving all extremities    Assessment/Plan:   (N40.1) Benign prostatic hyperplasia with lower urinary tract symptoms, symptom details unspecified  (primary encounter diagnosis)  Comment: Doing well post TURP  Plan: See in 3 months for reexamination

## 2019-12-01 ENCOUNTER — ANESTHESIA EVENT (OUTPATIENT)
Dept: GASTROENTEROLOGY | Facility: CLINIC | Age: 64
End: 2019-12-01
Payer: COMMERCIAL

## 2019-12-01 ASSESSMENT — LIFESTYLE VARIABLES: TOBACCO_USE: 1

## 2019-12-02 ENCOUNTER — HOSPITAL ENCOUNTER (OUTPATIENT)
Facility: CLINIC | Age: 64
Discharge: HOME | End: 2019-12-02
Attending: FAMILY MEDICINE | Admitting: FAMILY MEDICINE
Payer: COMMERCIAL

## 2019-12-02 ENCOUNTER — SURGERY (OUTPATIENT)
Age: 64
End: 2019-12-02
Payer: COMMERCIAL

## 2019-12-02 ENCOUNTER — ANESTHESIA (OUTPATIENT)
Dept: GASTROENTEROLOGY | Facility: CLINIC | Age: 64
End: 2019-12-02
Payer: COMMERCIAL

## 2019-12-02 VITALS
RESPIRATION RATE: 16 BRPM | DIASTOLIC BLOOD PRESSURE: 80 MMHG | HEART RATE: 68 BPM | TEMPERATURE: 97.4 F | OXYGEN SATURATION: 100 % | SYSTOLIC BLOOD PRESSURE: 111 MMHG

## 2019-12-02 LAB — COLONOSCOPY: NORMAL

## 2019-12-02 PROCEDURE — 25800030 ZZH RX IP 258 OP 636: Performed by: NURSE ANESTHETIST, CERTIFIED REGISTERED

## 2019-12-02 PROCEDURE — 88305 TISSUE EXAM BY PATHOLOGIST: CPT | Mod: 26 | Performed by: FAMILY MEDICINE

## 2019-12-02 PROCEDURE — 37000008 ZZH ANESTHESIA TECHNICAL FEE, 1ST 30 MIN: Performed by: FAMILY MEDICINE

## 2019-12-02 PROCEDURE — 37000009 ZZH ANESTHESIA TECHNICAL FEE, EACH ADDTL 15 MIN: Performed by: FAMILY MEDICINE

## 2019-12-02 PROCEDURE — 45385 COLONOSCOPY W/LESION REMOVAL: CPT | Mod: PT | Performed by: FAMILY MEDICINE

## 2019-12-02 PROCEDURE — 25000125 ZZHC RX 250: Performed by: NURSE ANESTHETIST, CERTIFIED REGISTERED

## 2019-12-02 PROCEDURE — 25000128 H RX IP 250 OP 636: Performed by: NURSE ANESTHETIST, CERTIFIED REGISTERED

## 2019-12-02 PROCEDURE — 45380 COLONOSCOPY AND BIOPSY: CPT | Performed by: FAMILY MEDICINE

## 2019-12-02 PROCEDURE — 88305 TISSUE EXAM BY PATHOLOGIST: CPT | Performed by: FAMILY MEDICINE

## 2019-12-02 RX ORDER — LIDOCAINE 40 MG/G
CREAM TOPICAL
Status: DISCONTINUED | OUTPATIENT
Start: 2019-12-02 | End: 2019-12-02

## 2019-12-02 RX ORDER — LIDOCAINE HYDROCHLORIDE 20 MG/ML
INJECTION, SOLUTION INFILTRATION; PERINEURAL PRN
Status: DISCONTINUED | OUTPATIENT
Start: 2019-12-02 | End: 2019-12-02

## 2019-12-02 RX ORDER — PROPOFOL 10 MG/ML
INJECTION, EMULSION INTRAVENOUS CONTINUOUS PRN
Status: DISCONTINUED | OUTPATIENT
Start: 2019-12-02 | End: 2019-12-02

## 2019-12-02 RX ORDER — ONDANSETRON 2 MG/ML
4 INJECTION INTRAMUSCULAR; INTRAVENOUS EVERY 30 MIN PRN
Status: DISCONTINUED | OUTPATIENT
Start: 2019-12-02 | End: 2019-12-02

## 2019-12-02 RX ORDER — NALOXONE HYDROCHLORIDE 0.4 MG/ML
.1-.4 INJECTION, SOLUTION INTRAMUSCULAR; INTRAVENOUS; SUBCUTANEOUS
Status: DISCONTINUED | OUTPATIENT
Start: 2019-12-02 | End: 2019-12-02

## 2019-12-02 RX ORDER — ONDANSETRON 4 MG/1
4 TABLET, ORALLY DISINTEGRATING ORAL EVERY 30 MIN PRN
Status: DISCONTINUED | OUTPATIENT
Start: 2019-12-02 | End: 2019-12-02

## 2019-12-02 RX ORDER — PROPOFOL 10 MG/ML
INJECTION, EMULSION INTRAVENOUS PRN
Status: DISCONTINUED | OUTPATIENT
Start: 2019-12-02 | End: 2019-12-02

## 2019-12-02 RX ORDER — ONDANSETRON 4 MG/1
4 TABLET, ORALLY DISINTEGRATING ORAL EVERY 6 HOURS PRN
Status: DISCONTINUED | OUTPATIENT
Start: 2019-12-02 | End: 2019-12-02 | Stop reason: HOSPADM

## 2019-12-02 RX ORDER — MEPERIDINE HYDROCHLORIDE 25 MG/ML
12.5 INJECTION INTRAMUSCULAR; INTRAVENOUS; SUBCUTANEOUS
Status: DISCONTINUED | OUTPATIENT
Start: 2019-12-02 | End: 2019-12-02 | Stop reason: HOSPADM

## 2019-12-02 RX ORDER — LIDOCAINE 40 MG/G
CREAM TOPICAL
Status: DISCONTINUED | OUTPATIENT
Start: 2019-12-02 | End: 2019-12-02 | Stop reason: HOSPADM

## 2019-12-02 RX ORDER — FLUMAZENIL 0.1 MG/ML
0.2 INJECTION, SOLUTION INTRAVENOUS
Status: DISCONTINUED | OUTPATIENT
Start: 2019-12-02 | End: 2019-12-02 | Stop reason: HOSPADM

## 2019-12-02 RX ORDER — ONDANSETRON 2 MG/ML
4 INJECTION INTRAMUSCULAR; INTRAVENOUS EVERY 6 HOURS PRN
Status: DISCONTINUED | OUTPATIENT
Start: 2019-12-02 | End: 2019-12-02 | Stop reason: HOSPADM

## 2019-12-02 RX ORDER — SODIUM CHLORIDE, SODIUM LACTATE, POTASSIUM CHLORIDE, CALCIUM CHLORIDE 600; 310; 30; 20 MG/100ML; MG/100ML; MG/100ML; MG/100ML
INJECTION, SOLUTION INTRAVENOUS CONTINUOUS
Status: DISCONTINUED | OUTPATIENT
Start: 2019-12-02 | End: 2019-12-02 | Stop reason: HOSPADM

## 2019-12-02 RX ORDER — NALOXONE HYDROCHLORIDE 0.4 MG/ML
.1-.4 INJECTION, SOLUTION INTRAMUSCULAR; INTRAVENOUS; SUBCUTANEOUS
Status: DISCONTINUED | OUTPATIENT
Start: 2019-12-02 | End: 2019-12-02 | Stop reason: HOSPADM

## 2019-12-02 RX ORDER — SODIUM CHLORIDE, SODIUM LACTATE, POTASSIUM CHLORIDE, CALCIUM CHLORIDE 600; 310; 30; 20 MG/100ML; MG/100ML; MG/100ML; MG/100ML
INJECTION, SOLUTION INTRAVENOUS CONTINUOUS
Status: DISCONTINUED | OUTPATIENT
Start: 2019-12-02 | End: 2019-12-02

## 2019-12-02 RX ORDER — ONDANSETRON 2 MG/ML
4 INJECTION INTRAMUSCULAR; INTRAVENOUS
Status: DISCONTINUED | OUTPATIENT
Start: 2019-12-02 | End: 2019-12-02

## 2019-12-02 RX ADMIN — LIDOCAINE HYDROCHLORIDE 1 ML: 10 INJECTION, SOLUTION EPIDURAL; INFILTRATION; INTRACAUDAL; PERINEURAL at 10:27

## 2019-12-02 RX ADMIN — LIDOCAINE HYDROCHLORIDE 40 MG: 20 INJECTION, SOLUTION INFILTRATION; PERINEURAL at 10:44

## 2019-12-02 RX ADMIN — PROPOFOL 70 MG: 10 INJECTION, EMULSION INTRAVENOUS at 10:44

## 2019-12-02 RX ADMIN — SODIUM CHLORIDE, POTASSIUM CHLORIDE, SODIUM LACTATE AND CALCIUM CHLORIDE: 600; 310; 30; 20 INJECTION, SOLUTION INTRAVENOUS at 10:39

## 2019-12-02 RX ADMIN — PROPOFOL 150 MCG/KG/MIN: 10 INJECTION, EMULSION INTRAVENOUS at 10:44

## 2019-12-02 RX ADMIN — PROPOFOL 30 MG: 10 INJECTION, EMULSION INTRAVENOUS at 10:47

## 2019-12-02 RX ADMIN — PROPOFOL 50 MG: 10 INJECTION, EMULSION INTRAVENOUS at 10:51

## 2019-12-02 RX ADMIN — PROPOFOL 40 MG: 10 INJECTION, EMULSION INTRAVENOUS at 10:56

## 2019-12-02 RX ADMIN — SODIUM CHLORIDE, POTASSIUM CHLORIDE, SODIUM LACTATE AND CALCIUM CHLORIDE: 600; 310; 30; 20 INJECTION, SOLUTION INTRAVENOUS at 10:27

## 2019-12-02 NOTE — ANESTHESIA CARE TRANSFER NOTE
Patient: Murphy Pratt    Procedure(s):  COLONOSCOPY, WITH POLYPECTOMY by snare    Diagnosis: * No pre-op diagnosis entered *  Diagnosis Additional Information: No value filed.    Anesthesia Type:   MAC     Note:  Airway :Face Mask  Patient transferred to:Phase II  Handoff Report: Identifed the Patient, Identified the Reponsible Provider, Reviewed the pertinent medical history, Discussed the surgical course, Reviewed Intra-OP anesthesia mangement and issues during anesthesia, Set expectations for post-procedure period and Allowed opportunity for questions and acknowledgement of understanding      Vitals: (Last set prior to Anesthesia Care Transfer)    CRNA VITALS  12/2/2019 1046 - 12/2/2019 1136      12/2/2019             SpO2:  98 %    Resp Rate (observed):  (!) 4                Electronically Signed By: TICO Del Valle CRNA  December 2, 2019  11:36 AM

## 2019-12-02 NOTE — DISCHARGE INSTRUCTIONS
[facility]    Home Care Following Endoscopy    Dr. Brown      Activity:    You have just undergone an endoscopic procedure usually performed with conscious sedation.  Do not work or operate machinery (including a car) for at least 12 hours.      I encourage you to walk and attempt to pass this air as soon as possible.    Diet:    Return to the diet you were on before your procedure but eat lightly for the first 12-24 hours.    Drink plenty of water.    Resume any regular medications unless otherwise advised by your physician.  Please begin any new medication prescribed as a result of your procedure as directed by your physician.     If you had any biopsy or polyp removed please refrain from aspirin or aspirin products for 2 days.  If on Coumadin please restart as instructed by your physician.   Pain:    You may take Tylenol as needed for pain.  Expected Recovery:  You can expect some mild abdominal fullness and/or discomfort due to the air used to inflate your intestinal tract.     Call Your Physician if You Have:    After Colonoscopy:  o Worsening persisting abdominal pain which is worse with activity.  o Fevers (>101 degrees F), chills or shakes.  o Passage of continued blood with bowel movements.   Any questions or concerns about your recovery, please call [phone] or after hours [phone] Nurse Advice Line.    Follow-up Care:    You should receive a call or letter with your results within 1 week. Please call if you have not received a notification of your results.  If asked to return to clinic please make an appointment 1 week after your procedure.  Call [phone].     [facility]   Adult Discharge Orders & Instructions - After Anesthesia    For 24 hours after surgery    1. Get plenty of rest.  A responsible adult must stay with you for at least 24 hours after you leave the hospital.   2. Do not drive or use heavy equipment.  If you have weakness or tingling, don't  drive or use heavy equipment until this feeling goes away.  3. Do not drink alcohol.  4. Avoid strenuous or risky activities.  Ask for help when climbing stairs.   5. You may feel lightheaded.  IF so, sit for a few minutes before standing.  Have someone help you get up.   6. If you have nausea (feel sick to your stomach): Drink only clear liquids such as apple juice, ginger ale, broth or 7-Up.  Rest may also help.  Be sure to drink enough fluids.  Move to a regular diet as you feel able.  7. You may have a slight fever. Call the doctor if your fever is over 100 F (37.7 C) (taken under the tongue) or lasts longer than 24 hours.  8. You may have a dry mouth, a sore throat, muscle aches or trouble sleeping.  These should go away after 24 hours.  9. Do not make important or legal decisions.   Call your doctor for any of the followin.  Signs of infection (fever, growing tenderness at the surgery site, a large amount of drainage or bleeding, severe pain, foul-smelling drainage, redness, swelling).    2. It has been over 8 to 10 hours since surgery and you are still not able to urinate (pass water).

## 2019-12-02 NOTE — ANESTHESIA PREPROCEDURE EVALUATION
Anesthesia Pre-Procedure Evaluation    Patient: Murphy Pratt   MRN: [id number] : 1955          Preoperative Diagnosis: * No pre-op diagnosis entered *    Procedure(s):  COLONOSCOPY    Past Medical History:   Diagnosis Date     Personal history of alcoholism (H)     Treatment in      Past Surgical History:   Procedure Laterality Date     COLONOSCOPY  2006     HC VASECTOMY UNILAT/BILAT W POSTOP SEMEN      Vasectomy     HERNIORRHAPHY INGUINAL Left 2017    Procedure: HERNIORRHAPHY INGUINAL;  Surgeon: Rand Ricketts MD;  Location: [facility] OR     LASER KTP TRANSURETHRAL RESECTION (TUR) PROSTATE N/A 10/24/2019    Procedure: TRANSURETHRAL RESECTION (TUR) PROSTATE, USING KTP LASER;  Surgeon: Jake Burleson MD;  Location: [facility] OR       Anesthesia Evaluation     . Pt has had prior anesthetic. Type: General and MAC    No history of anesthetic complications          ROS/MED HX    ENT/Pulmonary:     (+)tobacco use, Current use 2 for 50 years packs/day  , . .    Neurologic:     (+)dementia,     Cardiovascular:  - neg cardiovascular ROS   (+) ----. : . . . :. . No previous cardiac testing       METS/Exercise Tolerance:  >4 METS   Hematologic:  - neg hematologic  ROS       Musculoskeletal:  - neg musculoskeletal ROS       GI/Hepatic:     (+) bowel prep,       Renal/Genitourinary:     (+) BPH,       Endo:  - neg endo ROS       Psychiatric: Comment: ETOH        Infectious Disease:  - neg infectious disease ROS       Malignancy:      - no malignancy   Other: Comment: H/o alcohol abuse - treatment in    - neg other ROS                      Physical Exam  Normal systems: cardiovascular and pulmonary    Airway   Mallampati: II  TM distance: >3 FB  Neck ROM: full    Dental   (+) upper dentures and lower dentures    Cardiovascular   Rhythm and rate: regular and normal      Pulmonary    breath sounds clear to auscultation            Lab Results   Component Value Date    WBC 6.8 10/28/2016    HGB 15.4  "10/28/2016    HCT 46.3 10/28/2016     10/28/2016     10/28/2016    POTASSIUM 4.0 10/28/2016    CHLORIDE 107 10/28/2016    CO2 30 10/28/2016    BUN 13 10/28/2016    CR 0.82 10/28/2016    GLC 89 10/28/2016    PADMA 8.3 (L) 10/28/2016    ALBUMIN 3.8 10/28/2016    PROTTOTAL 6.9 10/28/2016    ALT 24 10/28/2016    AST 20 10/28/2016    ALKPHOS 54 10/28/2016    BILITOTAL 0.5 10/28/2016    TSH 1.71 10/28/2016       Preop Vitals  BP Readings from Last 3 Encounters:   10/31/19 139/87   10/24/19 131/79   10/14/19 132/68    Pulse Readings from Last 3 Encounters:   10/31/19 68   10/24/19 69   10/14/19 81      Resp Readings from Last 3 Encounters:   10/31/19 16   10/24/19 16   10/14/19 16    SpO2 Readings from Last 3 Encounters:   10/31/19 95%   10/24/19 92%   10/14/19 93%      Temp Readings from Last 1 Encounters:   10/31/19 97.8  F (36.6  C) (Oral)    Ht Readings from Last 1 Encounters:   10/24/19 1.806 m (5' 11.1\")      Wt Readings from Last 1 Encounters:   10/24/19 79.6 kg (175 lb 6.4 oz)    Estimated body mass index is 24.39 kg/m  as calculated from the following:    Height as of 10/24/19: 1.806 m (5' 11.1\").    Weight as of 10/24/19: 79.6 kg (175 lb 6.4 oz).       Anesthesia Plan      History & Physical Review  History and physical reviewed and following examination; no interval change.    ASA Status:  2 .    NPO Status:  > 6 hours    Plan for MAC with Propofol induction. Maintenance will be TIVA.  Reason for MAC:  Deep or markedly invasive procedure (G8)         Postoperative Care  Postoperative pain management:  IV analgesics, Oral pain medications and Multi-modal analgesia.      Consents  Anesthetic plan, risks, benefits and alternatives discussed with:  Patient.  Use of blood products discussed: No .   .                 TICO Del Valle CRNA  "

## 2019-12-02 NOTE — ANESTHESIA POSTPROCEDURE EVALUATION
Patient: Murphy Pratt    Procedure(s):  COLONOSCOPY, WITH POLYPECTOMY by snare    Diagnosis:* No pre-op diagnosis entered *  Diagnosis Additional Information: No value filed.    Anesthesia Type:  MAC    Note:  Anesthesia Post Evaluation    Patient location during evaluation: Phase 2 and Bedside  Patient participation: Able to fully participate in evaluation  Level of consciousness: awake and alert  Pain management: adequate  Airway patency: patent  Cardiovascular status: acceptable  Respiratory status: acceptable  Hydration status: acceptable  PONV: none     Anesthetic complications: None          Last vitals:  Vitals:    12/02/19 1017 12/02/19 1118   BP: 130/74 109/69   Pulse:  80   Resp: 16 16   Temp: 97.4  F (36.3  C)    SpO2:  100%         Electronically Signed By: TICO Del Valle CRNA  December 2, 2019  11:36 AM

## 2019-12-02 NOTE — H&P
"Pre-Endoscopy History and Physical     Murphy Pratt MRN# [id number]   YOB: 1955 Age: 64 year old     Date of Procedure: 12/2/2019  Primary care provider: Kimmie Contreras  Type of Endoscopy: colonoscopy  Type of Anesthesia Anticipated: MAC     HPI:    Murphy is a 64 year old male who will be undergoing screening or surveillance colonoscopy.    Murphy is feeling well today. Can get up a single flight of stairs without dyspnea. Estimated METS > 4.      Patient Active Problem List   Diagnosis     Advanced directives, counseling/discussion     Tobacco use disorder     Benign prostatic hyperplasia with urinary frequency     Alcohol dependence in remission (H)     Benign prostatic hyperplasia with lower urinary tract symptoms, symptom details unspecified          REVIEW OF SYSTEMS:     5 point ROS negative except as noted above in HPI, including Gen., Resp., CV, GI &  system review.      PHYSICAL EXAM:   /74   Temp 97.4  F (36.3  C) (Axillary)   Resp 16    Estimated body mass index is 24.39 kg/m  as calculated from the following:    Height as of 10/24/19: 1.806 m (5' 11.1\").    Weight as of 10/24/19: 79.6 kg (175 lb 6.4 oz).    GENERAL APPEARANCE: alert and no distress  RESP: lungs clear and unlabored  CV: regular  ABD: soft, nt/nd    DIAGNOSTICS:    Not indicated      IMPRESSION   ASA Class 2 - Mild systemic disease        PLAN:     Plan for colonoscopy. No medical contraindications to proceed, or further work up needed. The risks and benefits of the procedure and the sedation options and risks were discussed with the patient. These include infection, bleeding, and small risk of colon perforation (1/1000 to 1/80450 depending on patient characteristics and type of procedure). Murphy was also explained to alternatives for colo-rectal screening. All questions were answered and informed consent was obtained.      The above has been forwarded to the consulting provider.      Signed Electronically by: Thuan ALCANTARA" MD Stephanie  December 2, 2019

## 2019-12-04 LAB — COPATH REPORT: NORMAL

## 2019-12-08 ENCOUNTER — HEALTH MAINTENANCE LETTER (OUTPATIENT)
Age: 64
End: 2019-12-08

## 2020-03-15 ENCOUNTER — HEALTH MAINTENANCE LETTER (OUTPATIENT)
Age: 65
End: 2020-03-15

## 2021-01-10 ENCOUNTER — HEALTH MAINTENANCE LETTER (OUTPATIENT)
Age: 66
End: 2021-01-10

## 2021-05-08 ENCOUNTER — HEALTH MAINTENANCE LETTER (OUTPATIENT)
Age: 66
End: 2021-05-08

## 2021-10-23 ENCOUNTER — HEALTH MAINTENANCE LETTER (OUTPATIENT)
Age: 66
End: 2021-10-23

## 2022-06-04 ENCOUNTER — HEALTH MAINTENANCE LETTER (OUTPATIENT)
Age: 67
End: 2022-06-04

## 2022-10-09 ENCOUNTER — HEALTH MAINTENANCE LETTER (OUTPATIENT)
Age: 67
End: 2022-10-09

## 2023-06-10 ENCOUNTER — HEALTH MAINTENANCE LETTER (OUTPATIENT)
Age: 68
End: 2023-06-10

## 2023-09-02 NOTE — PROGRESS NOTES
INITIAL PSYCHIATRIC HISTORY & PHYSICAL    Patient Identification:  Name:   Muriel Szymanski  Age:   17 y.o.  Sex:   male  :   2006  MRN:   [id number]  Visit Number:   [id number]  Primary Care Physician:   Provider, No Known    SUBJECTIVE    CC/Focus of Exam: Depression    HPI: Muriel Szymanski is a 17 y.o. male who was admitted on 2023 with complaints of depression. Patient reports he has been having thoughts to hurt himself and other people  Denies any particular plan at this time but previously stated he would use a gun. Patient  denies intent to harm any particular individual. Patient states he needs help with sadness, increased outbursts of anger at home and with bad language at home. Patient  reports thoughts of hurting self and others were due to voices. Patient denies any substance abuse. Patient denies any alcohol abuse. Patient denies any tobacco use. Patient states the death of his mother as a stressor in his life. Patient denies any history of physical, mental or sexual abuse.  Patient rates his appetite as good. Patient rates his sleep as good. Patient denies any nightmares. Patient rates his anxiety on a scale of 1-10 with 10 being the most severe a 10.  Patient rates his depression on a scale of 1-10 with 10 being the most severe a 10.  Patient states he has suicidal ideation.  Patient denies any homicidal ideation.  Patient states he has hallucinations.  Patient was admitted to [facility] psychiatry for further safety and stabilization.    Available medical/psychiatric records reviewed and incorporated into the current document.     PAST PSYCHIATRIC HX: Patient has had no prior admissions. Patient states that he receives outpatient care with [facility].     SUBSTANCE USE HX: UDS was positive for benzodiazepine. See HPI for current use.     SOCIAL HX: Patient states that he was born and raised in [place], [place]. Patient states that he currently resides with his  SUBJECTIVE:  Murphy Pratt is a 63 year old male who presents to the clinic today with a chief complaint of cough  for 3 week(s).  His cough is described as productive of yellow sputum.    The patient's symptoms are moderate and worsening.  Associated symptoms include malaise. The patient's symptoms are exacerbated by no particular triggers  Patient has been using OTC cough suppressants  to improve symptoms.    Past Medical History:   Diagnosis Date     Personal history of alcoholism (H)     Treatment in 1984     Current Outpatient Medications   Medication Sig Dispense Refill     azithromycin (ZITHROMAX) 250 MG tablet Two tablets first day, then one tablet daily for four days. 6 tablet 0     finasteride (PROSCAR) 5 MG tablet Take 1 tablet (5 mg) by mouth daily 90 tablet 3     Multiple Vitamins-Minerals (MULTIVITAMIN & MINERAL PO)        tamsulosin (FLOMAX) 0.4 MG capsule Take 1 capsule (0.4 mg) by mouth daily 90 capsule 3     VITAMIN D, CHOLECALCIFEROL, PO Take by mouth daily       cephALEXin (KEFLEX) 500 MG capsule Take 1 capsule (500 mg) by mouth 4 times daily 40 capsule 0     Fexofenadine HCl (ALLEGRA PO)        History   Smoking Status     Current Every Day Smoker     Packs/day: 2.00     Years: 47.00     Types: Cigarettes   Smokeless Tobacco     Never Used     Comment: 2 ppd, started age 15       ROS  Review of systems negative except as stated above.    OBJECTIVE:  /82   Pulse 73   Temp 97.7  F (36.5  C) (Oral)   SpO2 98%   GENERAL APPEARANCE: healthy, alert and no distress  EYES: EOMI,  PERRL, conjunctiva clear  HENT: ear canals and TM's normal.  Nose and mouth without ulcers, erythema or lesions  NECK: supple, nontender, no lymphadenopathy  RESP: rhonchi throughout  CV: regular rates and rhythm, normal S1 S2, no murmur noted  ABDOMEN:  soft, nontender, no HSM or masses and bowel sounds normal  NEURO: Normal strength and tone, sensory exam grossly normal,  normal speech and mentation  SKIN: no  aunt in [place], [place]. Patient states that he is single and has no children. Patient states that he is currently unemployed. Patient states that he is currently in the 12th grade and attends [employer]. Patient denies any legal issues.     Past Medical History:   Diagnosis Date    ADHD (attention deficit hyperactivity disorder)     Autism     Oppositional behavior        Past Surgical History:   Procedure Laterality Date    DENTAL EXAMINATION UNDER ANESTHESIA      EAR TUBES      TONSILLECTOMY         Family History   Problem Relation Age of Onset    Lung cancer Mother     Drug abuse Father          Medications Prior to Admission   Medication Sig Dispense Refill Last Dose    cloNIDine (CATAPRES) 0.3 MG tablet Take 1 tablet by mouth Daily.   9/1/2023    cyproheptadine (PERIACTIN) 4 MG tablet Take 1 tablet by mouth 2 (Two) Times a Day.   9/1/2023    FLUoxetine (PROzac) 20 MG capsule Take 1 capsule by mouth Daily.   9/1/2023    hydrOXYzine (ATARAX) 25 MG tablet Take 1 tablet by mouth Every 8 (Eight) Hours As Needed for Itching.   9/1/2023    methylphenidate (RITALIN) 20 MG tablet Take 1 tablet by mouth 2 (Two) Times a Day.   9/1/2023    risperiDONE (risperDAL) 2 MG tablet Take 1 tablet by mouth 2 (Two) Times a Day.   9/1/2023    traZODone (DESYREL) 100 MG tablet Take 1 tablet by mouth At Night As Needed for Sleep.   9/1/2023           ALLERGIES:  Abilify [aripiprazole] and Concerta [methylphenidate]    Temp:  [97.5 °F (36.4 °C)-98.3 °F (36.8 °C)] 97.6 °F (36.4 °C)  Heart Rate:  [] 105  Resp:  [16-18] 16  BP: (127-144)/(70-80) 144/76    REVIEW OF SYSTEMS:  Review of Systems   Constitutional: Negative.    HENT: Negative.     Psychiatric/Behavioral:  Positive for suicidal ideas.    All other systems reviewed and are negative.   See HPI for psychiatric ROS  OBJECTIVE    PHYSICAL EXAM:  Physical Exam  Constitutional:       Appearance: Normal appearance.   Eyes:      Conjunctiva/sclera: Conjunctivae  suspicious lesions or rashes    ASSESSMENT:    Acute Bronchitis  COPD exacerbation    PLAN:  No orders of the defined types were placed in this encounter.    Symptomatic measures encouraged, humidified air, plenty of fluids.  Follow up with primary care provider if no improvement.   normal.   Musculoskeletal:         General: Normal range of motion.   Neurological:      General: No focal deficit present.      Mental Status: He is alert. Mental status is at baseline.       MENTAL STATUS EXAM:               Hygiene:   fair  Cooperation:  Cooperative  Eye Contact:  Good  Psychomotor Behavior:  Appropriate  Affect:  Appropriate  Hopelessness: 5  Speech:  Normal  Linear  Thought Content:  Normal  Suicidal:  Suicidal Ideation  Homicidal:  None  Hallucinations:  Visual  Delusion:  None  Memory:  Intact  Orientation:  Person, Place, Time, and Situation  Reliability:  fair  Insight:  Fair  Judgement:  Fair  Impulse Control:  Poor      Imaging Results (Last 24 Hours)       ** No results found for the last 24 hours. **             ECG/EMG Results (most recent)       None             Lab Results   Component Value Date    GLUCOSE 91 09/01/2023    BUN 11 09/01/2023    CREATININE 0.92 09/01/2023    BCR 12.0 09/01/2023    CO2 27.8 09/01/2023    CALCIUM 10.2 09/01/2023    ALBUMIN 5.3 (H) 09/01/2023    AST 16 09/01/2023    ALT 9 09/01/2023       Lab Results   Component Value Date    WBC 6.24 09/01/2023    HGB 14.9 09/01/2023    HCT 45.8 09/01/2023    MCV 87.4 09/01/2023     09/01/2023       Pain Management Panel          Latest Ref Rng & Units 9/1/2023   Pain Management Panel   Amphetamine, Urine Qual Negative Negative    Barbiturates Screen, Urine Negative Negative    Benzodiazepine Screen, Urine Negative Positive    Buprenorphine, Screen, Urine Negative Negative    Cocaine Screen, Urine Negative Negative    Fentanyl, Urine Negative Negative    Methadone Screen , Urine Negative Negative    Methamphetamine, Ur Negative Negative        Brief Urine Lab Results  (Last result in the past 365 days)        Color   Clarity   Blood   Leuk Est   Nitrite   Protein   CREAT   Urine HCG        09/01/23 1544 Yellow   Clear   Negative   Negative   Negative   Negative                   Reviewed labs and studies done with  this admission.   cloNIDine, 0.3 mg, Oral, Daily  cyproheptadine, 4 mg, Oral, BID  FLUoxetine, 20 mg, Oral, Daily  methylphenidate, 20 mg, Oral, BID  risperiDONE, 2 mg, Oral, BID         ASSESSMENT & PLAN:    Suicidal ideations   Admit to inpatient for crisis stabilization  Sp3 precuations    Attention deficit hyperactivity disorder   Ritalin 20 mg po bid   Clonidine 0.3 mg po q daily   R/O IDD , R/O Autistic spectrum disorder    Hx Bipolar affective disorder   Continue Fluoxetine 20 mg po q daily   Risperidone 2 mg po bid   Need to be monitored for drug drug interaction between Fluoxetine and risperidone.    Appetite stimulation- Cyproheptadine         The patient has been admitted for safety and stabilization.  Patient will be monitored for suicidality daily and maintained on Special Precautions Level 3 (q15 min checks) Special Precautions Level 3 (q15 min checks) .  The patient will have individual and group therapy with a master's level therapist. A master treatment plan will be developed and agreed upon by the patient and his/her treatment team.  The patient's estimated length of stay in the hospital is 5-7 days.       Written by Kailey Woods acting as scribe for Dr.Snehamala Abbasi signature on this note affirms that the note adequately documents the care provided.   This note was generated using a scribe,   Kailey Woods MA  09/02/23  12:01 PM EDT

## 2023-09-24 NOTE — NURSING NOTE
Patient meal tray arrived, ambulated to the restroom independently, currently in room eating and watching tv.    Reviewed and verified allergies, medications.

## 2024-04-28 ENCOUNTER — APPOINTMENT (OUTPATIENT)
Dept: GENERAL RADIOLOGY | Facility: CLINIC | Age: 69
End: 2024-04-28
Attending: EMERGENCY MEDICINE
Payer: MEDICARE

## 2024-04-28 ENCOUNTER — HOSPITAL ENCOUNTER (EMERGENCY)
Facility: CLINIC | Age: 69
Discharge: HOME | End: 2024-04-28
Attending: EMERGENCY MEDICINE | Admitting: EMERGENCY MEDICINE
Payer: MEDICARE

## 2024-04-28 VITALS
HEART RATE: 101 BPM | SYSTOLIC BLOOD PRESSURE: 155 MMHG | OXYGEN SATURATION: 95 % | BODY MASS INDEX: 25.9 KG/M2 | WEIGHT: 185 LBS | RESPIRATION RATE: 24 BRPM | DIASTOLIC BLOOD PRESSURE: 89 MMHG | HEIGHT: 71 IN | TEMPERATURE: 97.4 F

## 2024-04-28 DIAGNOSIS — J18.9 ATYPICAL PNEUMONIA: ICD-10-CM

## 2024-04-28 PROCEDURE — 94640 AIRWAY INHALATION TREATMENT: CPT

## 2024-04-28 PROCEDURE — 99284 EMERGENCY DEPT VISIT MOD MDM: CPT | Mod: 25 | Performed by: EMERGENCY MEDICINE

## 2024-04-28 PROCEDURE — 71045 X-RAY EXAM CHEST 1 VIEW: CPT

## 2024-04-28 PROCEDURE — 250N000011 HC RX IP 250 OP 636: Performed by: EMERGENCY MEDICINE

## 2024-04-28 PROCEDURE — 250N000009 HC RX 250: Performed by: EMERGENCY MEDICINE

## 2024-04-28 PROCEDURE — 99283 EMERGENCY DEPT VISIT LOW MDM: CPT | Performed by: EMERGENCY MEDICINE

## 2024-04-28 PROCEDURE — 96372 THER/PROPH/DIAG INJ SC/IM: CPT | Performed by: EMERGENCY MEDICINE

## 2024-04-28 RX ORDER — ALBUTEROL SULFATE 90 UG/1
2 AEROSOL, METERED RESPIRATORY (INHALATION) EVERY 6 HOURS PRN
Qty: 18 G | Refills: 0 | Status: SHIPPED | OUTPATIENT
Start: 2024-04-28

## 2024-04-28 RX ORDER — AZITHROMYCIN 250 MG/1
TABLET, FILM COATED ORAL
Qty: 6 TABLET | Refills: 0 | Status: SHIPPED | OUTPATIENT
Start: 2024-04-28 | End: 2024-05-03

## 2024-04-28 RX ORDER — DEXAMETHASONE SODIUM PHOSPHATE 10 MG/ML
10 INJECTION, SOLUTION INTRAMUSCULAR; INTRAVENOUS ONCE
Status: COMPLETED | OUTPATIENT
Start: 2024-04-28 | End: 2024-04-28

## 2024-04-28 RX ORDER — IPRATROPIUM BROMIDE AND ALBUTEROL SULFATE 2.5; .5 MG/3ML; MG/3ML
3 SOLUTION RESPIRATORY (INHALATION) ONCE
Status: COMPLETED | OUTPATIENT
Start: 2024-04-28 | End: 2024-04-28

## 2024-04-28 RX ADMIN — IPRATROPIUM BROMIDE AND ALBUTEROL SULFATE 3 ML: .5; 3 SOLUTION RESPIRATORY (INHALATION) at 10:02

## 2024-04-28 RX ADMIN — DEXAMETHASONE SODIUM PHOSPHATE 10 MG: 10 INJECTION, SOLUTION INTRAMUSCULAR; INTRAVENOUS at 09:45

## 2024-04-28 ASSESSMENT — ACTIVITIES OF DAILY LIVING (ADL): ADLS_ACUITY_SCORE: 35

## 2024-04-28 ASSESSMENT — COLUMBIA-SUICIDE SEVERITY RATING SCALE - C-SSRS
2. HAVE YOU ACTUALLY HAD ANY THOUGHTS OF KILLING YOURSELF IN THE PAST MONTH?: NO
1. IN THE PAST MONTH, HAVE YOU WISHED YOU WERE DEAD OR WISHED YOU COULD GO TO SLEEP AND NOT WAKE UP?: NO
6. HAVE YOU EVER DONE ANYTHING, STARTED TO DO ANYTHING, OR PREPARED TO DO ANYTHING TO END YOUR LIFE?: NO

## 2024-04-28 NOTE — ED TRIAGE NOTES
Pt reports that he has been experiencing a cough for the past week that continues to worsen. Concerns for pneumonia. He also reports on and off low grade fevers.      Triage Assessment (Adult)       Row Name 04/28/24 0913          Triage Assessment    Airway WDL WDL        Respiratory WDL    Respiratory WDL X;cough        Skin Circulation/Temperature WDL    Skin Circulation/Temperature WDL WDL        Cardiac WDL    Cardiac WDL WDL        Peripheral/Neurovascular WDL    Peripheral Neurovascular WDL WDL        Cognitive/Neuro/Behavioral WDL    Cognitive/Neuro/Behavioral WDL WDL

## 2024-04-28 NOTE — DISCHARGE INSTRUCTIONS
Chest x-ray shows signs of atypical pneumonia.  Will start on antibiotic.  Take the first dose today.  Complete the course even if you begin to feel better    You may use the inhaler, 2 puffs every 4-6 hours as needed to help with cough, wheezing, or shortness of breath    Drink plenty of fluids and get plenty of rest.  You may take Tylenol or ibuprofen per bottle instructions as needed for any fever or aches or pains    You may continue to use over-the-counter cough or cold medication to help with symptoms as needed    If you develop any new or worsening symptoms return promptly to the ER for evaluation.  As you should follow-up with your primary doctor in clinic within 1 to 2 weeks

## 2024-04-28 NOTE — ED PROVIDER NOTES
History     Chief Complaint   Patient presents with    Cough     HPI  Murphy Pratt is a 69 year old male who presents with cough.  Symptoms have been present for 1 week and have been getting worse.  Cough is productive of green phlegm.  Has not noticed any blood in sputum.  Subjective fever, but has not measured his temperature at home.  Says he was around a friend 1 week ago who had a mild cough and thinks that he may have gotten Murphy ill.  Denies chest pain, no nausea or vomiting.  Has been taking NyQuil at night to help him sleep, but continues to have worsening cough.  His wife also notes that Murphy is very short of breath.  He is a former smoker, quit smoking 4 years ago.  No previous diagnosis of COPD or asthma, but thinks that he may be developing this.    Allergies:  Allergies   Allergen Reactions    Penicillins Hives     Fever T: 104 F        Problem List:    Patient Active Problem List    Diagnosis Date Noted    Benign prostatic hyperplasia with urinary frequency 10/14/2019     Priority: Medium    Alcohol dependence in remission (H) 10/14/2019     Priority: Medium    Benign prostatic hyperplasia with lower urinary tract symptoms, symptom details unspecified 09/26/2019     Priority: Medium     Added automatically from request for surgery [id number]      Tobacco use disorder 10/28/2016     Priority: Medium    Advanced directives, counseling/discussion 10/21/2016     Priority: Medium     Gave pt information on 10/21/16.            Past Medical History:    Past Medical History:   Diagnosis Date    Personal history of alcoholism (H)        Past Surgical History:    Past Surgical History:   Procedure Laterality Date    COLONOSCOPY  04/03/2006    COLONOSCOPY N/A 12/2/2019    Procedure: COLONOSCOPY, WITH POLYPECTOMY by snare;  Surgeon: Thuan Brown MD;  [facility]: [facility]    HC VASECTOMY UNILAT/BILAT W POSTOP SEMEN      Vasectomy    HERNIORRHAPHY INGUINAL Left 1/2/2017    Procedure: HERNIORRHAPHY INGUINAL;   "Surgeon: Rand Ricketts MD;  Location: [facility]    LASER KTP TRANSURETHRAL RESECTION (TUR) PROSTATE N/A 10/24/2019    Procedure: TRANSURETHRAL RESECTION (TUR) PROSTATE, USING KTP LASER;  Surgeon: Jake Burleson MD;  Location: [facility]       Family History:    Family History   Problem Relation Age of Onset    Alcohol/Drug Mother     Substance Abuse Mother         alcoholism    Alcohol/Drug Father     Heart Disease Father          at 51    Asthma Brother     No Known Problems Brother     No Known Problems Brother     No Known Problems Sister     No Known Problems Sister     Unknown/Adopted Maternal Grandmother     Unknown/Adopted Maternal Grandfather     Unknown/Adopted Paternal Grandmother     Unknown/Adopted Paternal Grandfather     No Known Problems Son     Family History Negative No family hx of        Social History:  Marital Status:   [2]  Social History     Tobacco Use    Smoking status: Former     Current packs/day: 2.00     Average packs/day: 2.0 packs/day for 2.2 years (4.4 ttl pk-yrs)     Types: Cigarettes     Start date: 2022     Quit date:     Smokeless tobacco: Never    Tobacco comments:     2 ppd, started age 15 quit 2022   Substance Use Topics    Alcohol use: No     Alcohol/week: 0.0 standard drinks of alcohol     Comment: quit in     Drug use: No        Medications:    albuterol (PROAIR HFA/PROVENTIL HFA/VENTOLIN HFA) 108 (90 Base) MCG/ACT inhaler  azithromycin (ZITHROMAX) 250 MG tablet  Fexofenadine HCl (ALLEGRA PO)  finasteride (PROSCAR) 5 MG tablet  HYDROcodone-acetaminophen (NORCO) 5-325 MG tablet  Multiple Vitamins-Minerals (MULTIVITAMIN & MINERAL PO)  VITAMIN D, CHOLECALCIFEROL, PO          Review of Systems   All other systems reviewed and are negative.      Physical Exam   BP: (!) 155/89  Pulse: 101  Temp: 97.4  F (36.3  C)  Resp: 24  Height: 179.1 cm (5' 10.5\")  Weight: 83.9 kg (185 lb)  SpO2: 95 %      Physical Exam  Vitals and nursing note reviewed. "   Constitutional:       Appearance: He is not diaphoretic.   HENT:      Head: Normocephalic.      Right Ear: There is impacted cerumen.      Left Ear: There is impacted cerumen.      Nose: Nose normal.      Mouth/Throat:      Mouth: Mucous membranes are moist.      Pharynx: No oropharyngeal exudate.   Eyes:      Conjunctiva/sclera: Conjunctivae normal.   Cardiovascular:      Rate and Rhythm: Normal rate and regular rhythm.   Pulmonary:      Breath sounds: Examination of the left-upper field reveals wheezing. Examination of the right-lower field reveals decreased breath sounds. Examination of the left-lower field reveals decreased breath sounds. Decreased breath sounds and wheezing present.   Musculoskeletal:      Right lower leg: No edema.      Left lower leg: No edema.   Neurological:      Mental Status: He is alert.         ED Course        Procedures              Critical Care time:  none               Results for orders placed or performed during the hospital encounter of 04/28/24 (from the past 24 hour(s))   XR Chest Port 1 View    Narrative    EXAM: XR CHEST PORTABLE 1 VIEW  LOCATION: [facility]  DATE: 04/28/2024    INDICATION: Chest.  COMPARISON: Right shoulder radiograph 03/10/2006.      Impression    IMPRESSION: Small opacity along the left heart border could be infectious/inflammatory. Interstitial opacities could reflect component of edema or sequelae of atypical infection. No significant pleural effusion or pneumothorax. Cardiomediastinal   silhouette is unremarkable.         Medications   dexAMETHasone PF (DECADRON) injection 10 mg (10 mg Intramuscular $Given 4/28/24 0945)   ipratropium - albuterol 0.5 mg/2.5 mg/3 mL (DUONEB) neb solution 3 mL (3 mLs Nebulization $Given 4/28/24 1002)       Assessments & Plan (with Medical Decision Making)  Murphy is a 69-year-old male presenting with 1 week of cough and subjective fever.  See history and physical exam as above  69-year-old  male in no acute respiratory distress, is hypertensive with blood pressure of 155/89, but otherwise vitally stable and afebrile.  He is oxygenating at 95% and above on room air.  No audible stridor, no tripoding, no increased work of breathing.  He does have frequent cough.  Auscultation of lungs revealed wheezing on expiration in the left upper lobe, but no wheezing noted and remainder of lung fields.  Also has diminished air movement throughout.  Deep inspiration cause patient to have coughing fit.  Will plan to get a chest x-ray, will give a neb treatment and steroid to help with wheezing and suspected inflammatory process.  Rule out COPD exacerbation versus pneumonia versus other viral upper respiratory infection  Chest x-ray results as above.  There are findings consistent with atypical pneumonia.  He feels the neb treatment improved his breathing and cough.  Does not feel he is wheezing.  Was able to sit back on the bed, which she was not able to do before.  Says that the steroid shot hurt.  Will plan to initiate macrolide antibiotics for atypical pneumonia.  Explained that also has anti-inflammatory properties and will not need additional steroids.  Will give an inhaler to help with cough, wheezing, and shortness of breath that he may use every 4-6 hours.  Offered medication for cough, such as Tessalon or codeine cough syrup, but he prefers to stick to NyQuil.  Advised follow-up with his primary provider in clinic within 1 to 2 weeks, and encouraged return to the ED if worsening symptoms develop.  Discharged in stable condition     I have reviewed the nursing notes.    I have reviewed the findings, diagnosis, plan and need for follow up with the patient.           Medical Decision Making  The patient's presentation was of low complexity (an acute and uncomplicated illness or injury).    The patient's evaluation involved:  ordering and/or review of 1 test(s) in this encounter (see separate area of note for  details)    The patient's management necessitated moderate risk (prescription drug management including medications given in the ED).        New Prescriptions    ALBUTEROL (PROAIR HFA/PROVENTIL HFA/VENTOLIN HFA) 108 (90 BASE) MCG/ACT INHALER    Inhale 2 puffs into the lungs every 6 hours as needed for shortness of breath, wheezing or cough    AZITHROMYCIN (ZITHROMAX) 250 MG TABLET    Take 2 tablets (500 mg) by mouth daily for 1 day, THEN 1 tablet (250 mg) daily for 4 days.       Final diagnoses:   Atypical pneumonia       4/28/2024   [facility] EMERGENCY DEPT       Marie Winchester DO  04/28/24 1030

## 2024-08-02 ENCOUNTER — OFFICE VISIT (OUTPATIENT)
Dept: FAMILY MEDICINE | Facility: CLINIC | Age: 69
End: 2024-08-02
Payer: MEDICARE

## 2024-08-02 VITALS
SYSTOLIC BLOOD PRESSURE: 128 MMHG | HEIGHT: 70 IN | RESPIRATION RATE: 14 BRPM | DIASTOLIC BLOOD PRESSURE: 68 MMHG | BODY MASS INDEX: 26.66 KG/M2 | HEART RATE: 65 BPM | OXYGEN SATURATION: 93 % | WEIGHT: 186.2 LBS | TEMPERATURE: 97.9 F

## 2024-08-02 DIAGNOSIS — Z87.891 PERSONAL HISTORY OF TOBACCO USE, PRESENTING HAZARDS TO HEALTH: ICD-10-CM

## 2024-08-02 DIAGNOSIS — F10.21 ALCOHOL DEPENDENCE IN REMISSION (H): ICD-10-CM

## 2024-08-02 DIAGNOSIS — R35.0 BENIGN PROSTATIC HYPERPLASIA WITH URINARY FREQUENCY: ICD-10-CM

## 2024-08-02 DIAGNOSIS — Z01.818 PREOP GENERAL PHYSICAL EXAM: Primary | ICD-10-CM

## 2024-08-02 DIAGNOSIS — H25.9 AGE-RELATED CATARACT OF BOTH EYES, UNSPECIFIED AGE-RELATED CATARACT TYPE: ICD-10-CM

## 2024-08-02 DIAGNOSIS — N40.1 BENIGN PROSTATIC HYPERPLASIA WITH URINARY FREQUENCY: ICD-10-CM

## 2024-08-02 PROCEDURE — 99204 OFFICE O/P NEW MOD 45 MIN: CPT | Performed by: STUDENT IN AN ORGANIZED HEALTH CARE EDUCATION/TRAINING PROGRAM

## 2024-08-02 ASSESSMENT — PAIN SCALES - GENERAL: PAINLEVEL: NO PAIN (1)

## 2024-08-02 NOTE — PROGRESS NOTES
Preoperative Evaluation  [address] [address]  Phone: [phone]  Fax: [phone]  Primary Provider: Kimmie Camejo Mai, MD  Pre-op Performing Provider: Bon Duenas MD  Aug 2, 2024             8/1/2024   Surgical Information   What procedure is being done? Phacoemulsification with standard intraocular lens implant    Facility or Hospital where procedure/surgery will be performed: [facility]   Who is doing the procedure / surgery? Fareed Melo MD    Date of surgery / procedure: 8/15/2024   Time of surgery / procedure: 10:00 AM    Where do you plan to recover after surgery? at home with family        Fax number for surgical facility: Note does not need to be faxed, will be available electronically in Epic.    Assessment & Plan     The proposed surgical procedure is considered LOW risk.    Problem List Items Addressed This Visit          Other    Benign prostatic hyperplasia with urinary frequency    Alcohol dependence in remission (H)     Other Visit Diagnoses       Preop general physical exam    -  Primary    Age-related cataract of both eyes, unspecified age-related cataract type        Personal history of tobacco use, presenting hazards to health              Rare albuterol use. Feeling well and no concerns prior to surgery. I did recommend lab work in the future for diabetes and cholesterol screen.         - No identified additional risk factors other than previously addressed    Antiplatelet or Anticoagulation Medication Instructions   - Patient is on no antiplatelet or anticoagulation medications.    Additional Medication Instructions  Patient is on no additional chronic medications    Recommendation  Approval given to proceed with proposed procedure, without further diagnostic evaluation.    Kim Arrington is a 69 year old, presenting for the following:  Pre-Op Exam          8/2/2024     9:41 AM    Additional Questions   Roomed by Breann         8/2/2024     9:41 AM   Patient Reported Additional Medications   Patient reports taking the following new medications magnesium     HPI related to upcoming procedure: cataract        8/1/2024   Pre-Op Questionnaire   Have you ever had a heart attack or stroke? No   Have you ever had surgery on your heart or blood vessels, such as a stent placement, a coronary artery bypass, or surgery on an artery in your head, neck, heart, or legs? No   Do you have chest pain with activity? No   Do you have a history of heart failure? No   Do you currently have a cold, bronchitis or symptoms of other infection? No   Do you have a cough, shortness of breath, or wheezing? No   Do you or anyone in your family have previous history of blood clots? No   Do you or does anyone in your family have a serious bleeding problem such as prolonged bleeding following surgeries or cuts? No   Have you ever had problems with anemia or been told to take iron pills? No   Have you had any abnormal blood loss such as black, tarry or bloody stools? No   Have you ever had a blood transfusion? No   Are you willing to have a blood transfusion if it is medically needed before, during, or after your surgery? Yes   Have you or any of your relatives ever had problems with anesthesia? No   Do you have sleep apnea, excessive snoring or daytime drowsiness? Snores   Do you have any artifical heart valves or other implanted medical devices like a pacemaker, defibrillator, or continuous glucose monitor? No   Do you have artificial joints? No   Are you allergic to latex? No        Health Care Directive  Patient does not have a Health Care Directive or Living Will: Discussed advance care planning with patient; information given to patient to review.    Preoperative Review of    reviewed - no record of controlled substances prescribed.      Status of Chronic Conditions:  See problem list for active medical  problems.  Problems all longstanding and stable, except as noted/documented.  See ROS for pertinent symptoms related to these conditions.    Patient Active Problem List    Diagnosis Date Noted    Benign prostatic hyperplasia with urinary frequency 10/14/2019     Priority: Medium    Alcohol dependence in remission (H) 10/14/2019     Priority: Medium    Benign prostatic hyperplasia with lower urinary tract symptoms, symptom details unspecified 09/26/2019     Priority: Medium     Added automatically from request for surgery [id number]      Tobacco use disorder 10/28/2016     Priority: Medium      Past Medical History:   Diagnosis Date    Personal history of alcoholism (H)     Treatment in 1984     Past Surgical History:   Procedure Laterality Date    COLONOSCOPY  04/03/2006    COLONOSCOPY N/A 12/2/2019    Procedure: COLONOSCOPY, WITH POLYPECTOMY by snare;  Surgeon: Thuan Brown MD;  Location: [facility]    HC VASECTOMY UNILAT/BILAT W POSTOP SEMEN      Vasectomy    HERNIORRHAPHY INGUINAL Left 1/2/2017    Procedure: HERNIORRHAPHY INGUINAL;  Surgeon: Rand Ricketts MD;  Location:  OR    LASER KTP TRANSURETHRAL RESECTION (TUR) PROSTATE N/A 10/24/2019    Procedure: TRANSURETHRAL RESECTION (TUR) PROSTATE, USING KTP LASER;  Surgeon: Jake Burleson MD;  Location:  OR     Current Outpatient Medications   Medication Sig Dispense Refill    albuterol (PROAIR HFA/PROVENTIL HFA/VENTOLIN HFA) 108 (90 Base) MCG/ACT inhaler Inhale 2 puffs into the lungs every 6 hours as needed for shortness of breath, wheezing or cough 18 g 0    Multiple Vitamins-Minerals (MULTIVITAMIN & MINERAL PO)       VITAMIN D, CHOLECALCIFEROL, PO Take by mouth daily         Allergies   Allergen Reactions    Penicillins Hives     Fever T: 104 F         Social History     Tobacco Use    Smoking status: Former     Current packs/day: 2.00     Average packs/day: 2.0 packs/day for 2.5 years (4.9 ttl pk-yrs)     Types: Cigarettes     Start date:  "2022     Quit date:      Passive exposure: Past    Smokeless tobacco: Never    Tobacco comments:     Plan on quitting in 2020   Substance Use Topics    Alcohol use: Not Currently     Comment: Alcoholic Dry since 1993     Family History   Problem Relation Age of Onset    Alcohol/Drug Mother     Substance Abuse Mother         Alcohol    Alcohol/Drug Father     Heart Disease Father          at 51    Substance Abuse Father         Alcohol    Asthma Brother     No Known Problems Brother     No Known Problems Brother     No Known Problems Sister     No Known Problems Sister     Unknown/Adopted Maternal Grandmother     Unknown/Adopted Maternal Grandfather     Unknown/Adopted Paternal Grandmother     Unknown/Adopted Paternal Grandfather     No Known Problems Son     Substance Abuse Sister         Alcohol and drugs    Substance Abuse Brother         Meth    Asthma Brother         Asthma    Substance Abuse Brother         Alcohol    Family History Negative No family hx of      History   Drug Use No             Review of Systems  Constitutional, HEENT, cardiovascular, pulmonary, GI, , musculoskeletal, neuro, skin, endocrine and psych systems are negative, except as otherwise noted.    Objective    /68   Pulse 65   Temp 97.9  F (36.6  C) (Oral)   Resp 14   Ht 1.772 m (5' 9.75\")   Wt 84.5 kg (186 lb 3.2 oz)   SpO2 93%   BMI 26.91 kg/m     Estimated body mass index is 26.91 kg/m  as calculated from the following:    Height as of this encounter: 1.772 m (5' 9.75\").    Weight as of this encounter: 84.5 kg (186 lb 3.2 oz).  Physical Exam  GENERAL: alert and no distress  EYES: Eyes grossly normal to inspection, PERRL and conjunctivae and sclerae normal  HENT: ear canals and TM's normal, nose and mouth without ulcers or lesions, no teeth  NECK: no adenopathy, no asymmetry, masses, or scars  RESP: lungs clear to auscultation - no rales, rhonchi or wheezes  CV: regular rate and rhythm, normal S1 " "S2, no S3 or S4, no murmur, click or rub, no peripheral edema  ABDOMEN: soft, nontender, no hepatosplenomegaly, no masses and bowel sounds normal  MS: no gross musculoskeletal defects noted, no edema  SKIN: no suspicious lesions or rashes  NEURO: Normal strength and tone, mentation intact and speech normal  PSYCH: mentation appears normal, affect normal/bright    No results for input(s): \"HGB\", \"PLT\", \"INR\", \"NA\", \"POTASSIUM\", \"CR\", \"A1C\" in the last 8760 hours.     Diagnostics  No labs were ordered during this visit.   No EKG required for low risk surgery (cataract, skin procedure, breast biopsy, etc).    Revised Cardiac Risk Index (RCRI)  The patient has the following serious cardiovascular risks for perioperative complications:   - No serious cardiac risks = 0 points     RCRI Interpretation: 0 points: Class I (very low risk - 0.4% complication rate)         Signed Electronically by: Bon Duenas MD  A copy of this evaluation report is provided to the requesting physician.         "

## 2024-08-03 ENCOUNTER — HEALTH MAINTENANCE LETTER (OUTPATIENT)
Age: 69
End: 2024-08-03

## 2024-08-14 ENCOUNTER — ANESTHESIA EVENT (OUTPATIENT)
Dept: SURGERY | Facility: CLINIC | Age: 69
End: 2024-08-14
Payer: MEDICARE

## 2024-08-14 ASSESSMENT — LIFESTYLE VARIABLES: TOBACCO_USE: 1

## 2024-08-15 ENCOUNTER — HOSPITAL ENCOUNTER (OUTPATIENT)
Facility: CLINIC | Age: 69
Discharge: HOME | End: 2024-08-15
Attending: INTERNAL MEDICINE | Admitting: INTERNAL MEDICINE
Payer: MEDICARE

## 2024-08-15 ENCOUNTER — ANESTHESIA (OUTPATIENT)
Dept: SURGERY | Facility: CLINIC | Age: 69
End: 2024-08-15
Payer: MEDICARE

## 2024-08-15 VITALS
DIASTOLIC BLOOD PRESSURE: 75 MMHG | RESPIRATION RATE: 16 BRPM | TEMPERATURE: 97.8 F | SYSTOLIC BLOOD PRESSURE: 119 MMHG | OXYGEN SATURATION: 95 % | HEART RATE: 52 BPM

## 2024-08-15 PROCEDURE — 250N000009 HC RX 250: Performed by: NURSE ANESTHETIST, CERTIFIED REGISTERED

## 2024-08-15 PROCEDURE — V2632 POST CHMBR INTRAOCULAR LENS: HCPCS | Performed by: INTERNAL MEDICINE

## 2024-08-15 PROCEDURE — 250N000009 HC RX 250: Performed by: INTERNAL MEDICINE

## 2024-08-15 PROCEDURE — 370N000004 HC ANESTHESIA CATARACT PACKAGE: Performed by: INTERNAL MEDICINE

## 2024-08-15 PROCEDURE — 250N000011 HC RX IP 250 OP 636: Performed by: NURSE ANESTHETIST, CERTIFIED REGISTERED

## 2024-08-15 PROCEDURE — 360N000007 HC CATARACT SURGICAL PACKAGE: Performed by: INTERNAL MEDICINE

## 2024-08-15 PROCEDURE — 250N000011 HC RX IP 250 OP 636: Performed by: INTERNAL MEDICINE

## 2024-08-15 PROCEDURE — 761N000008 HC RECOVERY CATRACT PACKAGE: Performed by: INTERNAL MEDICINE

## 2024-08-15 DEVICE — EYE IMP IOL AMO PCL TECNIS ZCB00 24.5: Type: IMPLANTABLE DEVICE | Site: EYE | Status: FUNCTIONAL

## 2024-08-15 RX ORDER — PHENYLEPHRINE HYDROCHLORIDE 25 MG/ML
1 SOLUTION/ DROPS OPHTHALMIC
Status: COMPLETED | OUTPATIENT
Start: 2024-08-15 | End: 2024-08-15

## 2024-08-15 RX ORDER — DICLOFENAC SODIUM 1 MG/ML
1 SOLUTION/ DROPS OPHTHALMIC
Status: COMPLETED | OUTPATIENT
Start: 2024-08-15 | End: 2024-08-15

## 2024-08-15 RX ORDER — PROPARACAINE HYDROCHLORIDE 5 MG/ML
1 SOLUTION/ DROPS OPHTHALMIC ONCE
Status: DISCONTINUED | OUTPATIENT
Start: 2024-08-15 | End: 2024-08-15 | Stop reason: HOSPADM

## 2024-08-15 RX ORDER — PREDNISOLONE/MOXIFLO/NEPAFENAC 1-0.5-0.1%
SUSPENSION, DROPS(FINAL DOSAGE FORM)(ML) OPHTHALMIC (EYE) PRN
Status: DISCONTINUED | OUTPATIENT
Start: 2024-08-15 | End: 2024-08-15 | Stop reason: HOSPADM

## 2024-08-15 RX ORDER — PROPARACAINE HYDROCHLORIDE 5 MG/ML
1 SOLUTION/ DROPS OPHTHALMIC ONCE
Status: COMPLETED | OUTPATIENT
Start: 2024-08-15 | End: 2024-08-15

## 2024-08-15 RX ORDER — MOXIFLOXACIN 5 MG/ML
1 SOLUTION/ DROPS OPHTHALMIC
Status: COMPLETED | OUTPATIENT
Start: 2024-08-15 | End: 2024-08-15

## 2024-08-15 RX ORDER — FENTANYL CITRATE 50 UG/ML
INJECTION, SOLUTION INTRAMUSCULAR; INTRAVENOUS PRN
Status: DISCONTINUED | OUTPATIENT
Start: 2024-08-15 | End: 2024-08-15

## 2024-08-15 RX ORDER — CYCLOPENTOLATE HYDROCHLORIDE 10 MG/ML
1 SOLUTION/ DROPS OPHTHALMIC
Status: COMPLETED | OUTPATIENT
Start: 2024-08-15 | End: 2024-08-15

## 2024-08-15 RX ORDER — NALOXONE HYDROCHLORIDE 0.4 MG/ML
0.1 INJECTION, SOLUTION INTRAMUSCULAR; INTRAVENOUS; SUBCUTANEOUS
Status: DISCONTINUED | OUTPATIENT
Start: 2024-08-15 | End: 2024-08-15 | Stop reason: HOSPADM

## 2024-08-15 RX ADMIN — DICLOFENAC SODIUM 1 DROP: 1 SOLUTION OPHTHALMIC at 09:32

## 2024-08-15 RX ADMIN — DICLOFENAC SODIUM 1 DROP: 1 SOLUTION OPHTHALMIC at 09:19

## 2024-08-15 RX ADMIN — CYCLOPENTOLATE HYDROCHLORIDE 1 DROP: 10 SOLUTION/ DROPS OPHTHALMIC at 09:31

## 2024-08-15 RX ADMIN — PHENYLEPHRINE HYDROCHLORIDE 1 DROP: 25 SOLUTION/ DROPS OPHTHALMIC at 09:28

## 2024-08-15 RX ADMIN — PHENYLEPHRINE HYDROCHLORIDE 1 DROP: 25 SOLUTION/ DROPS OPHTHALMIC at 09:19

## 2024-08-15 RX ADMIN — CYCLOPENTOLATE HYDROCHLORIDE 1 DROP: 10 SOLUTION/ DROPS OPHTHALMIC at 09:19

## 2024-08-15 RX ADMIN — PHENYLEPHRINE HYDROCHLORIDE 1 DROP: 25 SOLUTION/ DROPS OPHTHALMIC at 09:31

## 2024-08-15 RX ADMIN — MOXIFLOXACIN 1 DROP: 5 SOLUTION/ DROPS OPHTHALMIC at 09:28

## 2024-08-15 RX ADMIN — MIDAZOLAM 1 MG: 1 INJECTION INTRAMUSCULAR; INTRAVENOUS at 10:19

## 2024-08-15 RX ADMIN — MOXIFLOXACIN 1 DROP: 5 SOLUTION/ DROPS OPHTHALMIC at 09:19

## 2024-08-15 RX ADMIN — FENTANYL CITRATE 50 MCG: 50 INJECTION INTRAMUSCULAR; INTRAVENOUS at 10:19

## 2024-08-15 RX ADMIN — MOXIFLOXACIN 1 DROP: 5 SOLUTION/ DROPS OPHTHALMIC at 09:31

## 2024-08-15 RX ADMIN — DICLOFENAC SODIUM 1 DROP: 1 SOLUTION OPHTHALMIC at 09:29

## 2024-08-15 RX ADMIN — LIDOCAINE HYDROCHLORIDE 0.1 ML: 10 INJECTION, SOLUTION EPIDURAL; INFILTRATION; INTRACAUDAL; PERINEURAL at 09:33

## 2024-08-15 RX ADMIN — CYCLOPENTOLATE HYDROCHLORIDE 1 DROP: 10 SOLUTION/ DROPS OPHTHALMIC at 09:28

## 2024-08-15 RX ADMIN — PROPARACAINE HYDROCHLORIDE 1 DROP: 5 SOLUTION/ DROPS OPHTHALMIC at 09:19

## 2024-08-15 ASSESSMENT — ACTIVITIES OF DAILY LIVING (ADL)
ADLS_ACUITY_SCORE: 31
ADLS_ACUITY_SCORE: 31

## 2024-08-15 NOTE — ANESTHESIA POSTPROCEDURE EVALUATION
Patient: Murphy Pratt    Procedure: Procedure(s):  Phacoemulsification with standard intraocular lens implant       Anesthesia Type:  MAC    Note:  Disposition: Outpatient   Postop Pain Control: Uneventful            Sign Out: Well controlled pain   PONV: No   Neuro/Psych: Uneventful            Sign Out: Acceptable/Baseline neuro status   Airway/Respiratory: Uneventful            Sign Out: Acceptable/Baseline resp. status   CV/Hemodynamics: Uneventful            Sign Out: Acceptable CV status   Other NRE: NONE   DID A NON-ROUTINE EVENT OCCUR? No    Event details/Postop Comments:  Pt was happy with anesthesia care.  No complications.  I will follow up with the pt if needed.           Last vitals:  Vitals Value Taken Time   /81 08/15/24 1045   Temp     Pulse 54 08/15/24 1045   Resp     SpO2 95 % 08/15/24 1057   Vitals shown include unfiled device data.    Electronically Signed By: TICO Cardenas CRNA  August 15, 2024  10:58 AM

## 2024-08-15 NOTE — ANESTHESIA PREPROCEDURE EVALUATION
Anesthesia Pre-Procedure Evaluation    Patient: Murphy Pratt   MRN: [id number] : 1955        Procedure : Procedure(s):  Phacoemulsification with standard intraocular lens implant          Past Medical History:   Diagnosis Date     Personal history of alcoholism (H)     Treatment in       Past Surgical History:   Procedure Laterality Date     COLONOSCOPY  2006     COLONOSCOPY N/A 2019    Procedure: COLONOSCOPY, WITH POLYPECTOMY by snare;  Surgeon: Thuan Brown MD;  [facility]: [facility]     HC VASECTOMY UNILAT/BILAT W POSTOP SEMEN      Vasectomy     HERNIORRHAPHY INGUINAL Left 2017    Procedure: HERNIORRHAPHY INGUINAL;  Surgeon: Rand Ricketts MD;  [facility]     LASER KTP TRANSURETHRAL RESECTION (TUR) PROSTATE N/A 10/24/2019    Procedure: TRANSURETHRAL RESECTION (TUR) PROSTATE, USING KTP LASER;  Surgeon: Jake Burleson MD;  [facility]      Allergies   Allergen Reactions     Penicillins Hives     Fever T: 104 F       Social History     Tobacco Use     Smoking status: Former     Current packs/day: 2.00     Average packs/day: 2.0 packs/day for 2.5 years (5.0 ttl pk-yrs)     Types: Cigarettes     Start date: 2022     Quit date:      Passive exposure: Past     Smokeless tobacco: Never     Tobacco comments:     Plan on quitting in 2020   Substance Use Topics     Alcohol use: Not Currently     Comment: Alcoholic Dry since 1993      Wt Readings from Last 1 Encounters:   24 84.5 kg (186 lb 3.2 oz)        Anesthesia Evaluation   Pt has had prior anesthetic. Type: General and MAC.        ROS/MED HX  ENT/Pulmonary:     (+)                tobacco use, Past use,    Intermittent, asthma  Treatment: Inhaler prn,                 Neurologic:  - neg neurologic ROS     Cardiovascular:  - neg cardiovascular ROS   (+)  - -   -  - -                                 No previous cardiac testing     METS/Exercise Tolerance:     Hematologic:  - neg hematologic  " ROS     Musculoskeletal:  - neg musculoskeletal ROS     GI/Hepatic:  - neg GI/hepatic ROS     Renal/Genitourinary:     (+)        BPH,      Endo:  - neg endo ROS     Psychiatric/Substance Use: Comment: ETOH abuse 1984, in remission  - neg psychiatric ROS   (+)   alcohol abuse      Infectious Disease:  - neg infectious disease ROS     Malignancy:  - neg malignancy ROS     Other:  - neg other ROS          Physical Exam    Airway  airway exam normal      Mallampati: II   TM distance: > 3 FB   Neck ROM: full   Mouth opening: > 3 cm    Respiratory Devices and Support         Dental     Comment: Upper and Lower dentures    (+) Edentulous      Cardiovascular   cardiovascular exam normal       Rhythm and rate: regular and normal     Pulmonary   pulmonary exam normal        breath sounds clear to auscultation       OUTSIDE LABS:  CBC:   Lab Results   Component Value Date    WBC 6.8 10/28/2016    WBC 11.6 (H) 03/10/2006    HGB 15.4 10/28/2016    HGB 17.5 03/10/2006    HCT 46.3 10/28/2016    HCT 50.7 03/10/2006     10/28/2016     03/10/2006     BMP:   Lab Results   Component Value Date     10/28/2016    POTASSIUM 4.0 10/28/2016    CHLORIDE 107 10/28/2016    CO2 30 10/28/2016    BUN 13 10/28/2016    CR 0.82 10/28/2016    GLC 89 10/28/2016    GLC 91 03/10/2006     COAGS: No results found for: \"PTT\", \"INR\", \"FIBR\"  POC: No results found for: \"BGM\", \"HCG\", \"HCGS\"  HEPATIC:   Lab Results   Component Value Date    ALBUMIN 3.8 10/28/2016    PROTTOTAL 6.9 10/28/2016    ALT 24 10/28/2016    AST 20 10/28/2016    ALKPHOS 54 10/28/2016    BILITOTAL 0.5 10/28/2016     OTHER:   Lab Results   Component Value Date    PADMA 8.3 (L) 10/28/2016    TSH 1.71 10/28/2016       Anesthesia Plan    ASA Status:  2    NPO Status:  NPO Appropriate    Anesthesia Type: MAC.     - Reason for MAC: straight local not clinically adequate              Consents    Anesthesia Plan(s) and associated risks, benefits, and realistic alternatives " "discussed. Questions answered and patient/representative(s) expressed understanding.     - Discussed:     - Discussed with:  Patient       Use of blood products discussed: No .     Postoperative Care    Pain management: IV analgesics.        Comments:    Other Comments: The risks and benefits of anesthesia, and the alternatives where applicable, have been discussed with the patient, and they wish to proceed.           TICO Cardenas CRNA    I have reviewed the pertinent notes and labs in the chart from the past 30 days and (re)examined the patient.  Any updates or changes from those notes are reflected in this note.              # Overweight: Estimated body mass index is 26.91 kg/m  as calculated from the following:    Height as of 8/2/24: 1.772 m (5' 9.75\").    Weight as of 8/2/24: 84.5 kg (186 lb 3.2 oz).      "

## 2024-08-15 NOTE — ANESTHESIA CARE TRANSFER NOTE
Patient: Murphy Pratt    Procedure: Procedure(s):  Phacoemulsification with standard intraocular lens implant       Diagnosis: Cataract [H26.9]  Diagnosis Additional Information: No value filed.    Anesthesia Type:   MAC     Note:    Oropharynx: oropharynx clear of all foreign objects and spontaneously breathing  Level of Consciousness: awake  Oxygen Supplementation: room air    Independent Airway: airway patency satisfactory and stable  Dentition: dentition unchanged  Vital Signs Stable: post-procedure vital signs reviewed and stable  Report to RN Given: handoff report given  Patient transferred to: Phase II    Handoff Report: Identifed the Patient, Identified the Reponsible Provider, Reviewed the pertinent medical history, Discussed the surgical course, Reviewed Intra-OP anesthesia mangement and issues during anesthesia, Set expectations for post-procedure period and Allowed opportunity for questions and acknowledgement of understanding      Vitals:  Vitals Value Taken Time   BP     Temp     Pulse     Resp     SpO2         Electronically Signed By: TICO Cardenas CRNA  August 15, 2024  10:44 AM

## 2024-08-15 NOTE — OP NOTE
[facility]  Ophthalmology Operative Note    PREOPERATIVE DIAGNOSIS: Cataract, Left eye.     POSTOPERATIVE DIAGNOSIS: Cataract, Left eye.     OPERATION: Cataract extraction with placement of posterior chamber intraocular lens in the Left eye.     ANESTHESIA: MAC combined with topical     INDICATIONS FOR PROCEDURE: Murphy Pratt was seen in the [facility] for decreased visual acuity in the Left eye. The patient was found to have a visually significant cataract in the Left eye. The risks, benefits, alternatives and goals of cataract extraction were discussed with the patient, and after adequate discussion the patient understood and agreed to these, and a signed informed consent was obtained prior to the procedure.     DESCRIPTION OF PROCEDURE: After proper patient identification, topical anesthesia was applied to the Left eye. The patient was brought to the operating room and the Left eye was prepped and draped in the usual sterile fashion for intraocular surgery. A lid speculum was placed in the Left eye. A paracentesis was then created and the anterior chamber was filled with 1% non-preserved lidocaine followed by Endocoat. A clear corneal incision was then created temporally using a 2.4mm keratome. A continuous curvilinear capsulorrhexis was then created using a cystotome and Utrata forceps. Hydrodissection was carried out with BSS on a cannula and the lens rotated freely within the capsular bag. Phacoemulsification was then carried out using the divide and conquer technique. Residual cortical material was removed using the I&A handpiece. The capsular bag was then filled with Healon and a ZCB00 +24.5 diopter intraocular lens was then injected into the capsular bag. The lens showed good centration and stability. Residual viscoelastic was removed using the I&A handpiece. The wound was then hydrated and the anterior chamber reformed. Intracameral Moxifloxacin was then  injected into the anterior chamber. The wounds were then checked and found to be sealed. Topical Prednisolone drops were placed in the patient's Left eye followed by a Berman shield over the top of this. The patient tolerated the procedure well without complications and was told to follow up in the clinic in the next postoperative day.     Implant Name Type Inv. Item Serial No.  Lot No. LRB No. Used Action   EYE IMP IOL MAYRA PCL TECNIS ZCB00 24.5 - [id number] Lens/Eye Implant EYE IMP IOL MAYRA PCL TECNIS ZCB00 24.5 [id number] [employer]  Left 1 Implanted        Ion Melo MD

## 2025-06-04 NOTE — MR AVS SNAPSHOT
"              After Visit Summary   6/8/2018    Murphy Pratt    MRN: [id number]           Patient Information     Date Of Birth          1955        Visit Information        Provider Department      6/8/2018 9:40 AM Kimmie Contreras MD [facility]        Today's Diagnoses     Mastitis chronic, left    -  1       Follow-ups after your visit        Who to contact     If you have questions or need follow up information about today's clinic visit or your schedule please contact [facility] directly at [phone].  Normal or non-critical lab and imaging results will be communicated to you by CADFORCEhart, letter or phone within 4 business days after the clinic has received the results. If you do not hear from us within 7 days, please contact the clinic through ImmuneXcitet or phone. If you have a critical or abnormal lab result, we will notify you by phone as soon as possible.  Submit refill requests through Influitive or call your pharmacy and they will forward the refill request to us. Please allow 3 business days for your refill to be completed.          Additional Information About Your Visit        MyChart Information     Influitive gives you secure access to your electronic health record. If you see a primary care provider, you can also send messages to your care team and make appointments. If you have questions, please call your primary care clinic.  If you do not have a primary care provider, please call [phone] and they will assist you.        Care EveryWhere ID     This is your Care EveryWhere ID. This could be used by other organizations to access your [place] medical records  [id number]        Your Vitals Were     Pulse Temperature Respirations Height Pulse Oximetry BMI (Body Mass Index)    88 98.1  F (36.7  C) (Temporal) 16 5' 10\" (1.778 m) 94% 25.28 kg/m2       Blood Pressure from Last 3 Encounters:   06/08/18 114/76   05/29/18 114/72   01/02/17 121/79    Weight from Last 3 " Encounters:   06/08/18 176 lb 3.2 oz (79.9 kg)   11/02/17 178 lb (80.7 kg)   01/16/17 170 lb (77.1 kg)              Today, you had the following     No orders found for display         Today's Medication Changes          These changes are accurate as of 6/8/18  2:47 PM.  If you have any questions, ask your nurse or doctor.               These medicines have changed or have updated prescriptions.        Dose/Directions    * cephALEXin 500 MG capsule   Commonly known as:  KEFLEX   This may have changed:  Another medication with the same name was added. Make sure you understand how and when to take each.   Used for:  Mastitis, Local infection of skin and subcutaneous tissue   Changed by:  Kimmie Contreras MD        Dose:  500 mg   Take 1 capsule (500 mg) by mouth 4 times daily   Quantity:  40 capsule   Refills:  0       * cephALEXin 500 MG capsule   Commonly known as:  KEFLEX   This may have changed:  You were already taking a medication with the same name, and this prescription was added. Make sure you understand how and when to take each.   Used for:  Mastitis chronic, left   Changed by:  Kimmie Contreras MD        Dose:  500 mg   Take 1 capsule (500 mg) by mouth 4 times daily   Quantity:  40 capsule   Refills:  0       * Notice:  This list has 2 medication(s) that are the same as other medications prescribed for you. Read the directions carefully, and ask your doctor or other care provider to review them with you.         Where to get your medicines      These medications were sent to [address] - [address]  [address], [facility] [address]     Phone:  [phone]     cephALEXin 500 MG capsule                Primary Care Provider Office Phone # Fax #    Kimmie Camejo Mai, [phone] [address] [facility]   [facility] [address]        Equal Access to Services     [facility]: Armand Coreas, benny wei, yris cedillo  laalyssa nath. So [facility] [phone].    ATENCIÓN: Si habla fredo, tiene a quintero disposición servicios gratuitos de asistencia lingüística. Mei posadas [phone].    We comply with applicable federal civil rights laws and [place] laws. We do not discriminate on the basis of race, color, national origin, age, disability, sex, sexual orientation, or gender identity.            Thank you!     Thank you for choosing [facility]  for your care. Our goal is always to provide you with excellent care. Hearing back from our patients is one way we can continue to improve our services. Please take a few minutes to complete the written survey that you may receive in the mail after your visit with us. Thank you!             Your Updated Medication List - Protect others around you: Learn how to safely use, store and throw away your medicines at www.disposemymeds.org.          This list is accurate as of 6/8/18  2:47 PM.  Always use your most recent med list.                   Brand Name Dispense Instructions for use Diagnosis    ALLEGRA PO           * cephALEXin 500 MG capsule    KEFLEX    40 capsule    Take 1 capsule (500 mg) by mouth 4 times daily    Mastitis, Local infection of skin and subcutaneous tissue       * cephALEXin 500 MG capsule    KEFLEX    40 capsule    Take 1 capsule (500 mg) by mouth 4 times daily    Mastitis chronic, left       finasteride 5 MG tablet    PROSCAR    90 tablet    Take 1 tablet (5 mg) by mouth daily    Benign prostatic hyperplasia with lower urinary tract symptoms, symptom details unspecified, Nocturia       MULTIVITAMIN & MINERAL PO           tamsulosin 0.4 MG capsule    FLOMAX    90 capsule    Take 1 capsule (0.4 mg) by mouth daily    Nocturia       VITAMIN D (CHOLECALCIFEROL) PO      Take by mouth daily        * Notice:  This list has 2 medication(s) that are the same as other medications prescribed for you. Read the directions carefully, and ask your doctor or other care provider to  review them with you.       show

## 2025-08-16 ENCOUNTER — HEALTH MAINTENANCE LETTER (OUTPATIENT)
Age: 70
End: 2025-08-16

## (undated) DEVICE — TUBING SUCTION 12"X1/4" N612

## (undated) DEVICE — PACK LITHOTOMY CUSTOM

## (undated) DEVICE — DRSG GAUZE 4X4" TRAY

## (undated) DEVICE — TUBING IRR TUR Y TYPE 2C4041

## (undated) DEVICE — SOL NACL 0.9% IRRIG 1000ML BOTTLE 07138-09

## (undated) DEVICE — Device

## (undated) DEVICE — SOL NACL 0.9% IRRIG 3000ML BAG 2B7477

## (undated) DEVICE — LUBRICATING JELLY 2OZ LJT2/04-8917

## (undated) DEVICE — SYR 30ML LL W/O NDL

## (undated) DEVICE — SOL WATER IRRIG 1000ML BOTTLE 07139-09

## (undated) DEVICE — PACK BASIC SET-UP 9101

## (undated) RX ORDER — DEXAMETHASONE SODIUM PHOSPHATE 10 MG/ML
INJECTION, SOLUTION INTRAMUSCULAR; INTRAVENOUS
Status: DISPENSED
Start: 2019-10-24

## (undated) RX ORDER — ONDANSETRON 2 MG/ML
INJECTION INTRAMUSCULAR; INTRAVENOUS
Status: DISPENSED
Start: 2019-10-24

## (undated) RX ORDER — PROPOFOL 10 MG/ML
INJECTION, EMULSION INTRAVENOUS
Status: DISPENSED
Start: 2019-10-24

## (undated) RX ORDER — FENTANYL CITRATE 50 UG/ML
INJECTION, SOLUTION INTRAMUSCULAR; INTRAVENOUS
Status: DISPENSED
Start: 2024-08-15

## (undated) RX ORDER — FENTANYL CITRATE 50 UG/ML
INJECTION, SOLUTION INTRAMUSCULAR; INTRAVENOUS
Status: DISPENSED
Start: 2019-10-24

## (undated) RX ORDER — LIDOCAINE HYDROCHLORIDE 20 MG/ML
INJECTION, SOLUTION EPIDURAL; INFILTRATION; INTRACAUDAL; PERINEURAL
Status: DISPENSED
Start: 2019-10-24

## (undated) RX ORDER — PHENYLEPHRINE HCL IN 0.9% NACL 1 MG/10 ML
SYRINGE (ML) INTRAVENOUS
Status: DISPENSED
Start: 2019-10-24